# Patient Record
Sex: FEMALE | Race: WHITE | HISPANIC OR LATINO
[De-identification: names, ages, dates, MRNs, and addresses within clinical notes are randomized per-mention and may not be internally consistent; named-entity substitution may affect disease eponyms.]

---

## 2017-01-06 ENCOUNTER — TRANSCRIPTION ENCOUNTER (OUTPATIENT)
Age: 27
End: 2017-01-06

## 2018-10-09 ENCOUNTER — EMERGENCY (EMERGENCY)
Facility: HOSPITAL | Age: 28
LOS: 1 days | End: 2018-10-09
Payer: MEDICAID

## 2018-10-09 PROCEDURE — 73503 X-RAY EXAM HIP UNI 4/> VIEWS: CPT | Mod: 26,LT

## 2018-10-09 PROCEDURE — 99283 EMERGENCY DEPT VISIT LOW MDM: CPT

## 2020-04-07 ENCOUNTER — TRANSCRIPTION ENCOUNTER (OUTPATIENT)
Age: 30
End: 2020-04-07

## 2020-04-07 ENCOUNTER — OUTPATIENT (OUTPATIENT)
Dept: OUTPATIENT SERVICES | Facility: HOSPITAL | Age: 30
LOS: 1 days | End: 2020-04-07

## 2020-04-07 ENCOUNTER — INPATIENT (INPATIENT)
Facility: HOSPITAL | Age: 30
LOS: 2 days | Discharge: HOME | End: 2020-04-10
Admitting: INTERNAL MEDICINE
Payer: COMMERCIAL

## 2020-04-07 PROCEDURE — 71045 X-RAY EXAM CHEST 1 VIEW: CPT | Mod: 26

## 2020-04-07 PROCEDURE — 99285 EMERGENCY DEPT VISIT HI MDM: CPT

## 2020-04-08 ENCOUNTER — OUTPATIENT (OUTPATIENT)
Dept: OUTPATIENT SERVICES | Facility: HOSPITAL | Age: 30
LOS: 1 days | End: 2020-04-08

## 2020-04-09 ENCOUNTER — OUTPATIENT (OUTPATIENT)
Dept: OUTPATIENT SERVICES | Facility: HOSPITAL | Age: 30
LOS: 1 days | End: 2020-04-09

## 2020-04-10 ENCOUNTER — OUTPATIENT (OUTPATIENT)
Dept: OUTPATIENT SERVICES | Facility: HOSPITAL | Age: 30
LOS: 1 days | End: 2020-04-10

## 2022-01-09 ENCOUNTER — TRANSCRIPTION ENCOUNTER (OUTPATIENT)
Age: 32
End: 2022-01-09

## 2022-01-18 ENCOUNTER — EMERGENCY (EMERGENCY)
Facility: HOSPITAL | Age: 32
LOS: 1 days | Discharge: HOME | End: 2022-01-18
Admitting: EMERGENCY MEDICINE
Payer: COMMERCIAL

## 2022-01-18 PROCEDURE — 93010 ELECTROCARDIOGRAM REPORT: CPT

## 2022-01-18 PROCEDURE — 99285 EMERGENCY DEPT VISIT HI MDM: CPT

## 2022-01-18 PROCEDURE — 71045 X-RAY EXAM CHEST 1 VIEW: CPT | Mod: 26

## 2022-01-27 DIAGNOSIS — R06.00 DYSPNEA, UNSPECIFIED: ICD-10-CM

## 2022-01-27 DIAGNOSIS — U07.1 COVID-19: ICD-10-CM

## 2022-01-27 DIAGNOSIS — J45.909 UNSPECIFIED ASTHMA, UNCOMPLICATED: ICD-10-CM

## 2022-09-01 ENCOUNTER — NON-APPOINTMENT (OUTPATIENT)
Age: 32
End: 2022-09-01

## 2023-03-16 ENCOUNTER — NON-APPOINTMENT (OUTPATIENT)
Age: 33
End: 2023-03-16

## 2023-05-21 ENCOUNTER — NON-APPOINTMENT (OUTPATIENT)
Age: 33
End: 2023-05-21

## 2023-09-03 ENCOUNTER — NON-APPOINTMENT (OUTPATIENT)
Age: 33
End: 2023-09-03

## 2023-11-29 ENCOUNTER — NON-APPOINTMENT (OUTPATIENT)
Age: 33
End: 2023-11-29

## 2023-12-11 ENCOUNTER — APPOINTMENT (OUTPATIENT)
Dept: AFTER HOURS CARE | Facility: EMERGENCY ROOM | Age: 33
End: 2023-12-11
Payer: COMMERCIAL

## 2023-12-12 ENCOUNTER — INPATIENT (INPATIENT)
Facility: HOSPITAL | Age: 33
LOS: 0 days | Discharge: HOME | DRG: 645 | End: 2023-12-13
Attending: STUDENT IN AN ORGANIZED HEALTH CARE EDUCATION/TRAINING PROGRAM | Admitting: STUDENT IN AN ORGANIZED HEALTH CARE EDUCATION/TRAINING PROGRAM
Payer: COMMERCIAL

## 2023-12-12 VITALS
TEMPERATURE: 97 F | WEIGHT: 285.06 LBS | OXYGEN SATURATION: 99 % | SYSTOLIC BLOOD PRESSURE: 135 MMHG | RESPIRATION RATE: 16 BRPM | DIASTOLIC BLOOD PRESSURE: 70 MMHG | HEIGHT: 65 IN | HEART RATE: 106 BPM

## 2023-12-12 DIAGNOSIS — D35.2 BENIGN NEOPLASM OF PITUITARY GLAND: ICD-10-CM

## 2023-12-12 PROBLEM — Z00.00 ENCOUNTER FOR PREVENTIVE HEALTH EXAMINATION: Status: ACTIVE | Noted: 2023-12-12

## 2023-12-12 LAB
ALBUMIN SERPL ELPH-MCNC: 3.6 G/DL — SIGNIFICANT CHANGE UP (ref 3.3–5.2)
ALBUMIN SERPL ELPH-MCNC: 3.6 G/DL — SIGNIFICANT CHANGE UP (ref 3.3–5.2)
ALP SERPL-CCNC: 79 U/L — SIGNIFICANT CHANGE UP (ref 40–120)
ALP SERPL-CCNC: 79 U/L — SIGNIFICANT CHANGE UP (ref 40–120)
ALT FLD-CCNC: 10 U/L — SIGNIFICANT CHANGE UP
ALT FLD-CCNC: 10 U/L — SIGNIFICANT CHANGE UP
AMPHET UR-MCNC: NEGATIVE — SIGNIFICANT CHANGE UP
AMPHET UR-MCNC: NEGATIVE — SIGNIFICANT CHANGE UP
ANION GAP SERPL CALC-SCNC: 14 MMOL/L — SIGNIFICANT CHANGE UP (ref 5–17)
ANION GAP SERPL CALC-SCNC: 14 MMOL/L — SIGNIFICANT CHANGE UP (ref 5–17)
APPEARANCE UR: ABNORMAL
APPEARANCE UR: ABNORMAL
APTT BLD: 26.7 SEC — SIGNIFICANT CHANGE UP (ref 24.5–35.6)
APTT BLD: 26.7 SEC — SIGNIFICANT CHANGE UP (ref 24.5–35.6)
AST SERPL-CCNC: 16 U/L — SIGNIFICANT CHANGE UP
AST SERPL-CCNC: 16 U/L — SIGNIFICANT CHANGE UP
BACTERIA # UR AUTO: ABNORMAL /HPF
BACTERIA # UR AUTO: ABNORMAL /HPF
BARBITURATES UR SCN-MCNC: NEGATIVE — SIGNIFICANT CHANGE UP
BARBITURATES UR SCN-MCNC: NEGATIVE — SIGNIFICANT CHANGE UP
BASOPHILS # BLD AUTO: 0.07 K/UL — SIGNIFICANT CHANGE UP (ref 0–0.2)
BASOPHILS # BLD AUTO: 0.07 K/UL — SIGNIFICANT CHANGE UP (ref 0–0.2)
BASOPHILS NFR BLD AUTO: 0.5 % — SIGNIFICANT CHANGE UP (ref 0–2)
BASOPHILS NFR BLD AUTO: 0.5 % — SIGNIFICANT CHANGE UP (ref 0–2)
BENZODIAZ UR-MCNC: NEGATIVE — SIGNIFICANT CHANGE UP
BENZODIAZ UR-MCNC: NEGATIVE — SIGNIFICANT CHANGE UP
BILIRUB SERPL-MCNC: <0.2 MG/DL — LOW (ref 0.4–2)
BILIRUB SERPL-MCNC: <0.2 MG/DL — LOW (ref 0.4–2)
BILIRUB UR-MCNC: NEGATIVE — SIGNIFICANT CHANGE UP
BILIRUB UR-MCNC: NEGATIVE — SIGNIFICANT CHANGE UP
BUN SERPL-MCNC: 8.6 MG/DL — SIGNIFICANT CHANGE UP (ref 8–20)
BUN SERPL-MCNC: 8.6 MG/DL — SIGNIFICANT CHANGE UP (ref 8–20)
CALCIUM SERPL-MCNC: 8.5 MG/DL — SIGNIFICANT CHANGE UP (ref 8.4–10.5)
CALCIUM SERPL-MCNC: 8.5 MG/DL — SIGNIFICANT CHANGE UP (ref 8.4–10.5)
CAST: 0 /LPF — SIGNIFICANT CHANGE UP (ref 0–4)
CAST: 0 /LPF — SIGNIFICANT CHANGE UP (ref 0–4)
CHLORIDE SERPL-SCNC: 97 MMOL/L — SIGNIFICANT CHANGE UP (ref 96–108)
CHLORIDE SERPL-SCNC: 97 MMOL/L — SIGNIFICANT CHANGE UP (ref 96–108)
CO2 SERPL-SCNC: 21 MMOL/L — LOW (ref 22–29)
CO2 SERPL-SCNC: 21 MMOL/L — LOW (ref 22–29)
COCAINE METAB.OTHER UR-MCNC: NEGATIVE — SIGNIFICANT CHANGE UP
COCAINE METAB.OTHER UR-MCNC: NEGATIVE — SIGNIFICANT CHANGE UP
COLOR SPEC: YELLOW — SIGNIFICANT CHANGE UP
COLOR SPEC: YELLOW — SIGNIFICANT CHANGE UP
CREAT SERPL-MCNC: 0.41 MG/DL — LOW (ref 0.5–1.3)
CREAT SERPL-MCNC: 0.41 MG/DL — LOW (ref 0.5–1.3)
DIFF PNL FLD: NEGATIVE — SIGNIFICANT CHANGE UP
DIFF PNL FLD: NEGATIVE — SIGNIFICANT CHANGE UP
EGFR: 132 ML/MIN/1.73M2 — SIGNIFICANT CHANGE UP
EGFR: 132 ML/MIN/1.73M2 — SIGNIFICANT CHANGE UP
EOSINOPHIL # BLD AUTO: 0.06 K/UL — SIGNIFICANT CHANGE UP (ref 0–0.5)
EOSINOPHIL # BLD AUTO: 0.06 K/UL — SIGNIFICANT CHANGE UP (ref 0–0.5)
EOSINOPHIL NFR BLD AUTO: 0.5 % — SIGNIFICANT CHANGE UP (ref 0–6)
EOSINOPHIL NFR BLD AUTO: 0.5 % — SIGNIFICANT CHANGE UP (ref 0–6)
FSH SERPL-MCNC: 1.7 IU/L — SIGNIFICANT CHANGE UP
FSH SERPL-MCNC: 1.7 IU/L — SIGNIFICANT CHANGE UP
GLUCOSE BLDC GLUCOMTR-MCNC: 122 MG/DL — HIGH (ref 70–99)
GLUCOSE BLDC GLUCOMTR-MCNC: 122 MG/DL — HIGH (ref 70–99)
GLUCOSE BLDC GLUCOMTR-MCNC: 136 MG/DL — HIGH (ref 70–99)
GLUCOSE BLDC GLUCOMTR-MCNC: 136 MG/DL — HIGH (ref 70–99)
GLUCOSE SERPL-MCNC: 145 MG/DL — HIGH (ref 70–99)
GLUCOSE SERPL-MCNC: 145 MG/DL — HIGH (ref 70–99)
GLUCOSE UR QL: NEGATIVE MG/DL — SIGNIFICANT CHANGE UP
GLUCOSE UR QL: NEGATIVE MG/DL — SIGNIFICANT CHANGE UP
HCG SERPL-ACNC: <4 MIU/ML — SIGNIFICANT CHANGE UP
HCG SERPL-ACNC: <4 MIU/ML — SIGNIFICANT CHANGE UP
HCG UR QL: NEGATIVE — SIGNIFICANT CHANGE UP
HCG UR QL: NEGATIVE — SIGNIFICANT CHANGE UP
HCT VFR BLD CALC: 36.5 % — SIGNIFICANT CHANGE UP (ref 34.5–45)
HCT VFR BLD CALC: 36.5 % — SIGNIFICANT CHANGE UP (ref 34.5–45)
HGB BLD-MCNC: 12.5 G/DL — SIGNIFICANT CHANGE UP (ref 11.5–15.5)
HGB BLD-MCNC: 12.5 G/DL — SIGNIFICANT CHANGE UP (ref 11.5–15.5)
IMM GRANULOCYTES NFR BLD AUTO: 0.3 % — SIGNIFICANT CHANGE UP (ref 0–0.9)
IMM GRANULOCYTES NFR BLD AUTO: 0.3 % — SIGNIFICANT CHANGE UP (ref 0–0.9)
INR BLD: 1.14 RATIO — SIGNIFICANT CHANGE UP (ref 0.85–1.18)
INR BLD: 1.14 RATIO — SIGNIFICANT CHANGE UP (ref 0.85–1.18)
KETONES UR-MCNC: NEGATIVE MG/DL — SIGNIFICANT CHANGE UP
KETONES UR-MCNC: NEGATIVE MG/DL — SIGNIFICANT CHANGE UP
LEUKOCYTE ESTERASE UR-ACNC: NEGATIVE — SIGNIFICANT CHANGE UP
LEUKOCYTE ESTERASE UR-ACNC: NEGATIVE — SIGNIFICANT CHANGE UP
LH SERPL-ACNC: 2.8 IU/L — SIGNIFICANT CHANGE UP
LH SERPL-ACNC: 2.8 IU/L — SIGNIFICANT CHANGE UP
LYMPHOCYTES # BLD AUTO: 16.7 % — SIGNIFICANT CHANGE UP (ref 13–44)
LYMPHOCYTES # BLD AUTO: 16.7 % — SIGNIFICANT CHANGE UP (ref 13–44)
LYMPHOCYTES # BLD AUTO: 2.22 K/UL — SIGNIFICANT CHANGE UP (ref 1–3.3)
LYMPHOCYTES # BLD AUTO: 2.22 K/UL — SIGNIFICANT CHANGE UP (ref 1–3.3)
MCHC RBC-ENTMCNC: 29.2 PG — SIGNIFICANT CHANGE UP (ref 27–34)
MCHC RBC-ENTMCNC: 29.2 PG — SIGNIFICANT CHANGE UP (ref 27–34)
MCHC RBC-ENTMCNC: 34.2 GM/DL — SIGNIFICANT CHANGE UP (ref 32–36)
MCHC RBC-ENTMCNC: 34.2 GM/DL — SIGNIFICANT CHANGE UP (ref 32–36)
MCV RBC AUTO: 85.3 FL — SIGNIFICANT CHANGE UP (ref 80–100)
MCV RBC AUTO: 85.3 FL — SIGNIFICANT CHANGE UP (ref 80–100)
METHADONE UR-MCNC: NEGATIVE — SIGNIFICANT CHANGE UP
METHADONE UR-MCNC: NEGATIVE — SIGNIFICANT CHANGE UP
MONOCYTES # BLD AUTO: 0.81 K/UL — SIGNIFICANT CHANGE UP (ref 0–0.9)
MONOCYTES # BLD AUTO: 0.81 K/UL — SIGNIFICANT CHANGE UP (ref 0–0.9)
MONOCYTES NFR BLD AUTO: 6.1 % — SIGNIFICANT CHANGE UP (ref 2–14)
MONOCYTES NFR BLD AUTO: 6.1 % — SIGNIFICANT CHANGE UP (ref 2–14)
NEUTROPHILS # BLD AUTO: 10.07 K/UL — HIGH (ref 1.8–7.4)
NEUTROPHILS # BLD AUTO: 10.07 K/UL — HIGH (ref 1.8–7.4)
NEUTROPHILS NFR BLD AUTO: 75.9 % — SIGNIFICANT CHANGE UP (ref 43–77)
NEUTROPHILS NFR BLD AUTO: 75.9 % — SIGNIFICANT CHANGE UP (ref 43–77)
NITRITE UR-MCNC: NEGATIVE — SIGNIFICANT CHANGE UP
NITRITE UR-MCNC: NEGATIVE — SIGNIFICANT CHANGE UP
OPIATES UR-MCNC: POSITIVE
OPIATES UR-MCNC: POSITIVE
PCP SPEC-MCNC: SIGNIFICANT CHANGE UP
PCP SPEC-MCNC: SIGNIFICANT CHANGE UP
PCP UR-MCNC: NEGATIVE — SIGNIFICANT CHANGE UP
PCP UR-MCNC: NEGATIVE — SIGNIFICANT CHANGE UP
PH UR: 7 — SIGNIFICANT CHANGE UP (ref 5–8)
PH UR: 7 — SIGNIFICANT CHANGE UP (ref 5–8)
PLATELET # BLD AUTO: 346 K/UL — SIGNIFICANT CHANGE UP (ref 150–400)
PLATELET # BLD AUTO: 346 K/UL — SIGNIFICANT CHANGE UP (ref 150–400)
POTASSIUM SERPL-MCNC: 3.8 MMOL/L — SIGNIFICANT CHANGE UP (ref 3.5–5.3)
POTASSIUM SERPL-MCNC: 3.8 MMOL/L — SIGNIFICANT CHANGE UP (ref 3.5–5.3)
POTASSIUM SERPL-SCNC: 3.8 MMOL/L — SIGNIFICANT CHANGE UP (ref 3.5–5.3)
POTASSIUM SERPL-SCNC: 3.8 MMOL/L — SIGNIFICANT CHANGE UP (ref 3.5–5.3)
PROLACTIN SERPL-MCNC: 32.8 NG/ML — HIGH (ref 3.4–24.1)
PROLACTIN SERPL-MCNC: 32.8 NG/ML — HIGH (ref 3.4–24.1)
PROLACTIN SERPL-MCNC: 34.2 NG/ML — HIGH (ref 3.4–24.1)
PROLACTIN SERPL-MCNC: 34.2 NG/ML — HIGH (ref 3.4–24.1)
PROT SERPL-MCNC: 6.7 G/DL — SIGNIFICANT CHANGE UP (ref 6.6–8.7)
PROT SERPL-MCNC: 6.7 G/DL — SIGNIFICANT CHANGE UP (ref 6.6–8.7)
PROT UR-MCNC: SIGNIFICANT CHANGE UP MG/DL
PROT UR-MCNC: SIGNIFICANT CHANGE UP MG/DL
PROTHROM AB SERPL-ACNC: 12.6 SEC — SIGNIFICANT CHANGE UP (ref 9.5–13)
PROTHROM AB SERPL-ACNC: 12.6 SEC — SIGNIFICANT CHANGE UP (ref 9.5–13)
RBC # BLD: 4.28 M/UL — SIGNIFICANT CHANGE UP (ref 3.8–5.2)
RBC # BLD: 4.28 M/UL — SIGNIFICANT CHANGE UP (ref 3.8–5.2)
RBC # FLD: 12.7 % — SIGNIFICANT CHANGE UP (ref 10.3–14.5)
RBC # FLD: 12.7 % — SIGNIFICANT CHANGE UP (ref 10.3–14.5)
RBC CASTS # UR COMP ASSIST: 3 /HPF — SIGNIFICANT CHANGE UP (ref 0–4)
RBC CASTS # UR COMP ASSIST: 3 /HPF — SIGNIFICANT CHANGE UP (ref 0–4)
SODIUM SERPL-SCNC: 132 MMOL/L — LOW (ref 135–145)
SODIUM SERPL-SCNC: 132 MMOL/L — LOW (ref 135–145)
SP GR SPEC: 1.03 — SIGNIFICANT CHANGE UP (ref 1–1.03)
SP GR SPEC: 1.03 — SIGNIFICANT CHANGE UP (ref 1–1.03)
SQUAMOUS # UR AUTO: 5 /HPF — SIGNIFICANT CHANGE UP (ref 0–5)
SQUAMOUS # UR AUTO: 5 /HPF — SIGNIFICANT CHANGE UP (ref 0–5)
T3 SERPL-MCNC: 200 NG/DL — SIGNIFICANT CHANGE UP (ref 80–200)
T3 SERPL-MCNC: 200 NG/DL — SIGNIFICANT CHANGE UP (ref 80–200)
T4 AB SER-ACNC: 10 UG/DL — SIGNIFICANT CHANGE UP (ref 4.5–12)
T4 AB SER-ACNC: 10 UG/DL — SIGNIFICANT CHANGE UP (ref 4.5–12)
TESTOST FREE+TOTAL PANEL SERPL-MCNC: 2.9 NG/DL — LOW (ref 8.4–48.1)
TESTOST FREE+TOTAL PANEL SERPL-MCNC: 2.9 NG/DL — LOW (ref 8.4–48.1)
THC UR QL: NEGATIVE — SIGNIFICANT CHANGE UP
THC UR QL: NEGATIVE — SIGNIFICANT CHANGE UP
TSH SERPL-MCNC: 1.7 UIU/ML — SIGNIFICANT CHANGE UP (ref 0.27–4.2)
TSH SERPL-MCNC: 1.7 UIU/ML — SIGNIFICANT CHANGE UP (ref 0.27–4.2)
UROBILINOGEN FLD QL: 0.2 MG/DL — SIGNIFICANT CHANGE UP (ref 0.2–1)
UROBILINOGEN FLD QL: 0.2 MG/DL — SIGNIFICANT CHANGE UP (ref 0.2–1)
WBC # BLD: 13.27 K/UL — HIGH (ref 3.8–10.5)
WBC # BLD: 13.27 K/UL — HIGH (ref 3.8–10.5)
WBC # FLD AUTO: 13.27 K/UL — HIGH (ref 3.8–10.5)
WBC # FLD AUTO: 13.27 K/UL — HIGH (ref 3.8–10.5)
WBC UR QL: 2 /HPF — SIGNIFICANT CHANGE UP (ref 0–5)
WBC UR QL: 2 /HPF — SIGNIFICANT CHANGE UP (ref 0–5)

## 2023-12-12 PROCEDURE — 99204 OFFICE O/P NEW MOD 45 MIN: CPT | Mod: NC,95

## 2023-12-12 PROCEDURE — 99223 1ST HOSP IP/OBS HIGH 75: CPT

## 2023-12-12 PROCEDURE — 70450 CT HEAD/BRAIN W/O DYE: CPT | Mod: 26,77

## 2023-12-12 PROCEDURE — 70553 MRI BRAIN STEM W/O & W/DYE: CPT | Mod: 26

## 2023-12-12 PROCEDURE — 99285 EMERGENCY DEPT VISIT HI MDM: CPT

## 2023-12-12 RX ORDER — OXYCODONE HYDROCHLORIDE 5 MG/1
5 TABLET ORAL EVERY 6 HOURS
Refills: 0 | Status: DISCONTINUED | OUTPATIENT
Start: 2023-12-12 | End: 2023-12-13

## 2023-12-12 RX ORDER — SODIUM CHLORIDE 9 MG/ML
1000 INJECTION INTRAMUSCULAR; INTRAVENOUS; SUBCUTANEOUS
Refills: 0 | Status: DISCONTINUED | OUTPATIENT
Start: 2023-12-12 | End: 2023-12-13

## 2023-12-12 RX ORDER — SODIUM CHLORIDE 9 MG/ML
1000 INJECTION, SOLUTION INTRAVENOUS
Refills: 0 | Status: DISCONTINUED | OUTPATIENT
Start: 2023-12-12 | End: 2023-12-13

## 2023-12-12 RX ORDER — DEXTROSE 50 % IN WATER 50 %
15 SYRINGE (ML) INTRAVENOUS ONCE
Refills: 0 | Status: DISCONTINUED | OUTPATIENT
Start: 2023-12-12 | End: 2023-12-13

## 2023-12-12 RX ORDER — DEXTROSE 50 % IN WATER 50 %
25 SYRINGE (ML) INTRAVENOUS ONCE
Refills: 0 | Status: DISCONTINUED | OUTPATIENT
Start: 2023-12-12 | End: 2023-12-13

## 2023-12-12 RX ORDER — ONDANSETRON 8 MG/1
4 TABLET, FILM COATED ORAL EVERY 4 HOURS
Refills: 0 | Status: DISCONTINUED | OUTPATIENT
Start: 2023-12-12 | End: 2023-12-13

## 2023-12-12 RX ORDER — GLUCAGON INJECTION, SOLUTION 0.5 MG/.1ML
1 INJECTION, SOLUTION SUBCUTANEOUS ONCE
Refills: 0 | Status: DISCONTINUED | OUTPATIENT
Start: 2023-12-12 | End: 2023-12-13

## 2023-12-12 RX ORDER — OXYCODONE HYDROCHLORIDE 5 MG/1
10 TABLET ORAL EVERY 6 HOURS
Refills: 0 | Status: DISCONTINUED | OUTPATIENT
Start: 2023-12-12 | End: 2023-12-13

## 2023-12-12 RX ORDER — METOCLOPRAMIDE HCL 10 MG
10 TABLET ORAL ONCE
Refills: 0 | Status: COMPLETED | OUTPATIENT
Start: 2023-12-12 | End: 2023-12-12

## 2023-12-12 RX ORDER — DEXTROSE 50 % IN WATER 50 %
12.5 SYRINGE (ML) INTRAVENOUS ONCE
Refills: 0 | Status: DISCONTINUED | OUTPATIENT
Start: 2023-12-12 | End: 2023-12-13

## 2023-12-12 RX ORDER — INSULIN LISPRO 100/ML
VIAL (ML) SUBCUTANEOUS
Refills: 0 | Status: DISCONTINUED | OUTPATIENT
Start: 2023-12-12 | End: 2023-12-13

## 2023-12-12 RX ORDER — ACETAMINOPHEN 500 MG
1000 TABLET ORAL ONCE
Refills: 0 | Status: COMPLETED | OUTPATIENT
Start: 2023-12-12 | End: 2023-12-12

## 2023-12-12 RX ORDER — MORPHINE SULFATE 50 MG/1
2 CAPSULE, EXTENDED RELEASE ORAL ONCE
Refills: 0 | Status: DISCONTINUED | OUTPATIENT
Start: 2023-12-12 | End: 2023-12-12

## 2023-12-12 RX ADMIN — MORPHINE SULFATE 2 MILLIGRAM(S): 50 CAPSULE, EXTENDED RELEASE ORAL at 09:53

## 2023-12-12 RX ADMIN — OXYCODONE HYDROCHLORIDE 10 MILLIGRAM(S): 5 TABLET ORAL at 14:00

## 2023-12-12 RX ADMIN — ONDANSETRON 4 MILLIGRAM(S): 8 TABLET, FILM COATED ORAL at 18:53

## 2023-12-12 RX ADMIN — Medication 10 MILLIGRAM(S): at 09:11

## 2023-12-12 RX ADMIN — MORPHINE SULFATE 2 MILLIGRAM(S): 50 CAPSULE, EXTENDED RELEASE ORAL at 09:11

## 2023-12-12 RX ADMIN — Medication 400 MILLIGRAM(S): at 20:14

## 2023-12-12 RX ADMIN — OXYCODONE HYDROCHLORIDE 10 MILLIGRAM(S): 5 TABLET ORAL at 18:54

## 2023-12-12 RX ADMIN — OXYCODONE HYDROCHLORIDE 10 MILLIGRAM(S): 5 TABLET ORAL at 13:23

## 2023-12-12 RX ADMIN — ONDANSETRON 4 MILLIGRAM(S): 8 TABLET, FILM COATED ORAL at 13:22

## 2023-12-12 NOTE — H&P ADULT - NSHPPHYSICALEXAM_GEN_ALL_CORE
IVF:  Vital Signs Last 24 Hrs  T(C): 36.1 (12 Dec 2023 08:22), Max: 36.1 (12 Dec 2023 08:22)  T(F): 97 (12 Dec 2023 08:22), Max: 97 (12 Dec 2023 08:22)  HR: 106 (12 Dec 2023 08:22) (106 - 106)  BP: 135/70 (12 Dec 2023 08:22) (135/70 - 135/70)  BP(mean): --  RR: 16 (12 Dec 2023 08:22) (16 - 16)  SpO2: 99% (12 Dec 2023 08:22) (99% - 99%)    Parameters below as of 12 Dec 2023 08:22  Patient On (Oxygen Delivery Method): room air    PHYSICAL EXAM:  GENERAL: NAD, Overweight  HEAD:  Atraumatic, normocephalic  NECK: Supple  NANCY COMA SCORE: E- V- M- =15  MENTAL STATUS: AAO x3; Awake, Opens eyes spontaneously, Appropriately conversant without aphasia, following simple commands  CRANIAL NERVES: Visual acuity normal for age, visual fields full to confrontation, PERRL. EOMI without nystagmus. Facial sensation intact V1-3 distribution b/l. Face symmetric w/ normal eye closure and smile, tongue midline. Hearing grossly intact. Speech clear  MOTOR: strength 5/5 b/l upper and lower extremities  HEART: +S1/+S2; Regular rate and rhythm; no murmurs, rubs, or gallops  ABDOMEN: Soft, nontender  SKIN: Warm, dry; no rashes or lesions

## 2023-12-12 NOTE — H&P ADULT - NSHPREVIEWOFSYSTEMS_GEN_ALL_CORE
[As Noted in HPI] : as noted in HPI [Negative] : Heme/Lymph REVIEW OF SYSTEMS  CONSTITUTIONAL: No fever  EYES: No visual disturbances  NECK: No pain or stiffness  BREASTS: No  nipple discharge  GASTROINTESTINAL: (+) Nausea   NEUROLOGICAL: (+) Headaches  SKIN: No itching, burning, rashes, or lesions   LYMPH NODES: No enlarged glands

## 2023-12-12 NOTE — ED PROVIDER NOTE - CADM POA URETHRAL CATHETER
Patient remains 1 on 1 due to aggressive behavior  Patient did not have any behavior this shift     Patient denies SI HI VH and depression  Patient reports anxiety and voices that come and go but cannot tell me what they are saying  Patient required redirection at times with random yelling  Patient becomes easily agitated but can deescalate just as easily  Patient appearance unremarkable  Thoughts are illogical and paranoid   Patient is very pleasant with staff and selectively social with peers  Patient is  Medication compliant  Medication compliant   Lithium level 0 7   depakote level 108 Will continue to monitor and provide suppoprt  7 minute safety and behavioral checks maintained  No

## 2023-12-12 NOTE — ED PROVIDER NOTE - NS ED ROS FT
Gen: No fever,in pain  eyes: no blurry vision  ENT: No congestion, no rhinorrhea  Resp: No cough, no trouble breathing  Cardiovascular: No chest pain, no palpitation  Gastrointestinal: No nausea, no vomiting, no diarrhea  :  No change in urine output; no dysuria, no hematuria  MS: No joint or muscle pain  Skin: No rashes  Neuro: + headache sudden onset, numb right side of  face   Remainder negative, except as per the HPI

## 2023-12-12 NOTE — ED ADULT NURSE REASSESSMENT NOTE - NS ED NURSE REASSESS COMMENT FT1
Pt laying in bed, resting. Pt reports improvement in her headache. Headache has not completely resolved. Neuro assessment as documented. Pt A&Ox4 in NAD. RR even & unlabored. Pt on cardiac monitor. Pt awaiting admission. Family @ bedside. Safety maintained.
Pt's mom to the nurses' station stating "My daughter needs the light off and that other pt is insisting he needs the lights on. That's not going to work for her. Can you get her a room upstairs?" This RN already made family aware that pt is awaiting room assignment and that at this time there is not a bed available to move pt to.
Report given to CHANO Wise.
Pt laying in bed, resting/sleeping. Pt A&Ox4 in NAD @ this time. RR even & unlabored. Pt on portable tele box. Pt's neuro assessment as documented. Pt awaiting admission. Safety maintained. Family @ bedside.
Pt laying in bed, A&Ox4 c/o headache & nausea. Neuro assessment as documented. RR even & unlabored. Pt NSR on cardiac monitor. Pt taken to MRI and then will be taken to CDU1R after. Mom @ bedside. Safety maintained.

## 2023-12-12 NOTE — ED ADULT NURSE NOTE - OBJECTIVE STATEMENT
Pt to ED c/o sudden onset of HA w/ n/v that started on Sunday w/ no improvement from Excedrin. Pt also c/o R facial numbness. Denies fever.

## 2023-12-12 NOTE — ED ADULT NURSE NOTE - NSFALLUNIVINTERV_ED_ALL_ED
Bed/Stretcher in lowest position, wheels locked, appropriate side rails in place/Call bell, personal items and telephone in reach/Instruct patient to call for assistance before getting out of bed/chair/stretcher/Non-slip footwear applied when patient is off stretcher/[place] to call system/Physically safe environment - no spills, clutter or unnecessary equipment/Purposeful proactive rounding/Room/bathroom lighting operational, light cord in reach Bed/Stretcher in lowest position, wheels locked, appropriate side rails in place/Call bell, personal items and telephone in reach/Instruct patient to call for assistance before getting out of bed/chair/stretcher/Non-slip footwear applied when patient is off stretcher/[place] to call system/Physically safe environment - no spills, clutter or unnecessary equipment/Purposeful proactive rounding/Room/bathroom lighting operational, light cord in reach

## 2023-12-12 NOTE — ED PROVIDER NOTE - CLINICAL SUMMARY MEDICAL DECISION MAKING FREE TEXT BOX
32 y/o female PMH Obese, DM on metformin, Hx of irregular menstrual periods. Comes to ED transferred form [facility] due to Pituitary mass but bleeding wasn't ruled out.  HA started Sunday morning right after waking up, pain was 10/10 in intensity and didn't went away. She has been taking OCP meds to regulate her menstrual cycle. Pt now presenting photophobia, neck pain that increase with neck flexion and numbness on the right side of face and HA 10/10 nausea and NBNB vomits. Denies blurry vison. Visual fields are intact. EOM preserved function. No diplopia. Strenght 5/5 all extremities.    A/P   1. NSG team alerted -> admit  2. Neuro exam   3. PAin meds and reglan  4. CT head    Dispo. Admit to NSG 34 y/o female PMH Obese, DM on metformin, Hx of irregular menstrual periods. Comes to ED transferred form [facility] due to Pituitary mass but bleeding wasn't ruled out.  HA started Sunday morning right after waking up, pain was 10/10 in intensity and didn't went away. She has been taking OCP meds to regulate her menstrual cycle. Pt now presenting photophobia, neck pain that increase with neck flexion and numbness on the right side of face and HA 10/10 nausea and NBNB vomits. Denies blurry vison. Visual fields are intact. EOM preserved function. No diplopia. Strenght 5/5 all extremities.    A/P   1. NSG team alerted -> admit  2. Neuro exam   3. PAin meds and reglan  4. CT head    Dispo. Admit to NSG

## 2023-12-12 NOTE — H&P ADULT - NS ATTEND AMEND GEN_ALL_CORE FT
I agree with the above. I personally examined and saw the patient. Complaining of headache and abdominal pain but no cranial nerve deficits or examination findings concerning for apoplexy. MRI pituitary for further characterization, pain control, likely outpatient management of pituitary lesion. Ok for diet today.

## 2023-12-12 NOTE — ED ADULT NURSE NOTE - COVID-19 ORDERING FACILITY
[facility]  - [facility] [facility]  - [facility]

## 2023-12-12 NOTE — H&P ADULT - ASSESSMENT
32 yo female who presented with a headache and was found to have concerns for a pituitary mass    Plan:  -Admit to Dr. Yamila Sanders, q4 neurochecks and vitals  - MRI Sella w/wo contrast  -Pituitary labs  -ADAT  -Pain Control  -SCD's   -DM: ISS  -MRI Sella w/wo contrast   -Case and plan discussed with Dr. Sanders 34 yo female who presented with a headache and was found to have concerns for a pituitary mass    Plan:  -Admit to Dr. Yamila Sanders, q4 neurochecks and vitals  - MRI Sella w/wo contrast  -Pituitary labs  -ADAT  -Pain Control  -SCD's   -DM: ISS  -MRI Sella w/wo contrast   -Case and plan discussed with Dr. Sanders 32 yo female who presented with a headache and was found to have concerns for a pituitary mass    Plan:  -Admit to Dr. Yamila Sanders, q4 neurochecks and vitals  -MRI Sella w/wo contrast  -Pituitary labs  -ADAT  -Pain Control  -SCD's   -DM: ISS  -MRI Sella w/wo contrast   -Case and plan discussed with Dr. Sanders 34 yo female who presented with a headache and was found to have concerns for a pituitary mass    Plan:  -Admit to Dr. Yamila Sanders, q4 neurochecks and vitals  -MRI Sella w/wo contrast  -Pituitary labs  -ADAT  -Pain Control  -SCD's   -DM: ISS  -MRI Sella w/wo contrast   -Case and plan discussed with Dr. Sanders

## 2023-12-12 NOTE — H&P ADULT - NSHPLABSRESULTS_GEN_ALL_CORE
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    Recommend neurosurgery consultation. Further workup with pituitary protocol MRI is advised.    --- End of Report ---            DALILA DELGADO MD; Attending Radiologist  This document has been electronically signed. Dec 12 2023 4:31AM    CAPRINI SCORE [CLOT]:  Patient has an estimated Caprini score of greater than 5.  However, the patient's unique clinical situation will be addressed in an individual manner to determine appropriate anticoagulation treatment, if any.

## 2023-12-12 NOTE — ED PROVIDER NOTE - COVID-19 ORDERING FACILITY
[facility]  - [facility] [facility]  - [facility]

## 2023-12-12 NOTE — ED PROVIDER NOTE - PHYSICAL EXAMINATION
Gen: obese, + acute distress  Head: normocephalic, atraumatic  EENT: EOMI, PERRLA, dry mucous membranes,  Lung: no increased work of breathing, clear to auscultation bilaterally, no wheezing,   CV: regular rate, regular rhythm, normal   Abd: soft, non-tender, non-distended, no rebound tenderness or guarding; no CVA tenderness  MSK: No edema, no visible deformities, full range of motion in all 4 extremities  Neuro: Awake, alert, oriented x3, decreased sensation on right side of face, Visual fields are intact. EOM preserved function. No diplopia. Strenght 5/5 all extremities.  Skin: No obvious rash, no jaundice  Psych: normal affect, normal speech

## 2023-12-12 NOTE — ED PROVIDER NOTE - PRINCIPAL DIAGNOSIS
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

## 2023-12-12 NOTE — ED PROVIDER NOTE - OBJECTIVE STATEMENT
32 y/o female PMH Obese, DM on metformin, Hx of irregular menstrual periods. Comes to ED transferred form [facility] due to Pituitary mass but bleeding wasn't ruled out.  HA started Sunday morning right after waking up, pain was 10/10 in intensity and didn't went away. She has been taking OCP meds to regulate her menstrual cycle. Pt now presenting photophobia, neck pain that increase with neck flexion and numbness on the right side of face and HA 10/10 nausea and NBNB vomits. Denies blurry vison. Visual fields are intact. EOM preserved function. No diplopia. Strenght 5/5 all extremities.

## 2023-12-12 NOTE — ED PROVIDER NOTE - ATTENDING CONTRIBUTION TO CARE
I personally saw the patient with the resident, and completed the key components of the history and physical exam. I then discussed the management plan with the resident.    32 y/o F with PMH DM on metformin, obesity transferred from [facility] for pituitary mass found on CT head when patient initially presented with sudden onset of worse headache of her life with photophobia, neck pain, intractable nausea and vomiting. Denies fevers. Her pain is currently 7/10. She denies vision changes, focal weakness, difficulty with speech or swallow.    PE - A&O x 3, mildly decreased sensation to right face, intact visual fields, EOMI, strength/sensation symmetrically intact all extremities, clear speech, no meningismus of neck, appears very uncomfortable.    Pain control - neurosurgery consulted, recommends CT and MRI, but will admit to NSx. I personally saw the patient with the resident, and completed the key components of the history and physical exam. I then discussed the management plan with the resident.    34 y/o F with PMH DM on metformin, obesity transferred [place] [facility] [place] found [place] CT head when patient initially presented with sudden onset of worse headache of her life with photophobia, neck pain, intractable nausea and vomiting. Denies fevers. Her pain is currently 7/10. She denies vision changes, focal weakness, difficulty with speech or swallow.    PE - A&O x 3, mildly decreased sensation to right face, intact visual fields, EOMI, strength/sensation symmetrically intact all extremities, clear speech, no meningismus of neck, appears very uncomfortable.    Pain control - neurosurgery consulted, recommends CT and MRI, but will admit to NSx.

## 2023-12-13 ENCOUNTER — TRANSCRIPTION ENCOUNTER (OUTPATIENT)
Age: 33
End: 2023-12-13

## 2023-12-13 VITALS
OXYGEN SATURATION: 99 % | DIASTOLIC BLOOD PRESSURE: 88 MMHG | RESPIRATION RATE: 18 BRPM | SYSTOLIC BLOOD PRESSURE: 130 MMHG | TEMPERATURE: 99 F | HEART RATE: 88 BPM

## 2023-12-13 LAB
A1C WITH ESTIMATED AVERAGE GLUCOSE RESULT: 7.1 % — HIGH (ref 4–5.6)
A1C WITH ESTIMATED AVERAGE GLUCOSE RESULT: 7.1 % — HIGH (ref 4–5.6)
ALBUMIN SERPL ELPH-MCNC: 3.8 G/DL — SIGNIFICANT CHANGE UP (ref 3.3–5.2)
ALBUMIN SERPL ELPH-MCNC: 3.8 G/DL — SIGNIFICANT CHANGE UP (ref 3.3–5.2)
ALP SERPL-CCNC: 91 U/L — SIGNIFICANT CHANGE UP (ref 40–120)
ALP SERPL-CCNC: 91 U/L — SIGNIFICANT CHANGE UP (ref 40–120)
ALT FLD-CCNC: 7 U/L — SIGNIFICANT CHANGE UP
ALT FLD-CCNC: 7 U/L — SIGNIFICANT CHANGE UP
ANION GAP SERPL CALC-SCNC: 11 MMOL/L — SIGNIFICANT CHANGE UP (ref 5–17)
ANION GAP SERPL CALC-SCNC: 11 MMOL/L — SIGNIFICANT CHANGE UP (ref 5–17)
AST SERPL-CCNC: 7 U/L — SIGNIFICANT CHANGE UP
AST SERPL-CCNC: 7 U/L — SIGNIFICANT CHANGE UP
BILIRUB SERPL-MCNC: 0.3 MG/DL — LOW (ref 0.4–2)
BILIRUB SERPL-MCNC: 0.3 MG/DL — LOW (ref 0.4–2)
BUN SERPL-MCNC: 4.7 MG/DL — LOW (ref 8–20)
BUN SERPL-MCNC: 4.7 MG/DL — LOW (ref 8–20)
CALCIUM SERPL-MCNC: 8.8 MG/DL — SIGNIFICANT CHANGE UP (ref 8.4–10.5)
CALCIUM SERPL-MCNC: 8.8 MG/DL — SIGNIFICANT CHANGE UP (ref 8.4–10.5)
CHLORIDE SERPL-SCNC: 101 MMOL/L — SIGNIFICANT CHANGE UP (ref 96–108)
CHLORIDE SERPL-SCNC: 101 MMOL/L — SIGNIFICANT CHANGE UP (ref 96–108)
CO2 SERPL-SCNC: 24 MMOL/L — SIGNIFICANT CHANGE UP (ref 22–29)
CO2 SERPL-SCNC: 24 MMOL/L — SIGNIFICANT CHANGE UP (ref 22–29)
CORTIS AM PEAK SERPL-MCNC: 29.8 UG/DL — HIGH (ref 6–18.4)
CORTIS AM PEAK SERPL-MCNC: 29.8 UG/DL — HIGH (ref 6–18.4)
CREAT SERPL-MCNC: 0.37 MG/DL — LOW (ref 0.5–1.3)
CREAT SERPL-MCNC: 0.37 MG/DL — LOW (ref 0.5–1.3)
EGFR: 136 ML/MIN/1.73M2 — SIGNIFICANT CHANGE UP
EGFR: 136 ML/MIN/1.73M2 — SIGNIFICANT CHANGE UP
ESTIMATED AVERAGE GLUCOSE: 157 MG/DL — HIGH (ref 68–114)
ESTIMATED AVERAGE GLUCOSE: 157 MG/DL — HIGH (ref 68–114)
GH SERPL-MCNC: 1.7 NG/ML — SIGNIFICANT CHANGE UP (ref 0.12–9.88)
GH SERPL-MCNC: 1.7 NG/ML — SIGNIFICANT CHANGE UP (ref 0.12–9.88)
GLUCOSE BLDC GLUCOMTR-MCNC: 126 MG/DL — HIGH (ref 70–99)
GLUCOSE BLDC GLUCOMTR-MCNC: 126 MG/DL — HIGH (ref 70–99)
GLUCOSE BLDC GLUCOMTR-MCNC: 181 MG/DL — HIGH (ref 70–99)
GLUCOSE BLDC GLUCOMTR-MCNC: 181 MG/DL — HIGH (ref 70–99)
GLUCOSE SERPL-MCNC: 155 MG/DL — HIGH (ref 70–99)
GLUCOSE SERPL-MCNC: 155 MG/DL — HIGH (ref 70–99)
INSULIN-LIKE GROWTH FACTOR 1 INTERPRETATION: SIGNIFICANT CHANGE UP
INSULIN-LIKE GROWTH FACTOR 1 INTERPRETATION: SIGNIFICANT CHANGE UP
INSULIN-LIKE GROWTH FACTOR 1: 129 NG/ML — SIGNIFICANT CHANGE UP (ref 82–279)
INSULIN-LIKE GROWTH FACTOR 1: 129 NG/ML — SIGNIFICANT CHANGE UP (ref 82–279)
MAGNESIUM SERPL-MCNC: 2.1 MG/DL — SIGNIFICANT CHANGE UP (ref 1.8–2.6)
MAGNESIUM SERPL-MCNC: 2.1 MG/DL — SIGNIFICANT CHANGE UP (ref 1.8–2.6)
PHOSPHATE SERPL-MCNC: 3.4 MG/DL — SIGNIFICANT CHANGE UP (ref 2.4–4.7)
PHOSPHATE SERPL-MCNC: 3.4 MG/DL — SIGNIFICANT CHANGE UP (ref 2.4–4.7)
POTASSIUM SERPL-MCNC: 4 MMOL/L — SIGNIFICANT CHANGE UP (ref 3.5–5.3)
POTASSIUM SERPL-MCNC: 4 MMOL/L — SIGNIFICANT CHANGE UP (ref 3.5–5.3)
POTASSIUM SERPL-SCNC: 4 MMOL/L — SIGNIFICANT CHANGE UP (ref 3.5–5.3)
POTASSIUM SERPL-SCNC: 4 MMOL/L — SIGNIFICANT CHANGE UP (ref 3.5–5.3)
PROT SERPL-MCNC: 7.2 G/DL — SIGNIFICANT CHANGE UP (ref 6.6–8.7)
PROT SERPL-MCNC: 7.2 G/DL — SIGNIFICANT CHANGE UP (ref 6.6–8.7)
SODIUM SERPL-SCNC: 135 MMOL/L — SIGNIFICANT CHANGE UP (ref 135–145)
SODIUM SERPL-SCNC: 135 MMOL/L — SIGNIFICANT CHANGE UP (ref 135–145)

## 2023-12-13 PROCEDURE — 81001 URINALYSIS AUTO W/SCOPE: CPT

## 2023-12-13 PROCEDURE — 80053 COMPREHEN METABOLIC PANEL: CPT

## 2023-12-13 PROCEDURE — 84436 ASSAY OF TOTAL THYROXINE: CPT

## 2023-12-13 PROCEDURE — 85025 COMPLETE CBC W/AUTO DIFF WBC: CPT

## 2023-12-13 PROCEDURE — G1004: CPT

## 2023-12-13 PROCEDURE — 82962 GLUCOSE BLOOD TEST: CPT

## 2023-12-13 PROCEDURE — 99239 HOSP IP/OBS DSCHRG MGMT >30: CPT

## 2023-12-13 PROCEDURE — 70553 MRI BRAIN STEM W/O & W/DYE: CPT | Mod: MG

## 2023-12-13 PROCEDURE — 82533 TOTAL CORTISOL: CPT

## 2023-12-13 PROCEDURE — 80307 DRUG TEST PRSMV CHEM ANLYZR: CPT

## 2023-12-13 PROCEDURE — 83735 ASSAY OF MAGNESIUM: CPT

## 2023-12-13 PROCEDURE — 82024 ASSAY OF ACTH: CPT

## 2023-12-13 PROCEDURE — 83001 ASSAY OF GONADOTROPIN (FSH): CPT

## 2023-12-13 PROCEDURE — 84146 ASSAY OF PROLACTIN: CPT

## 2023-12-13 PROCEDURE — 81025 URINE PREGNANCY TEST: CPT

## 2023-12-13 PROCEDURE — 84305 ASSAY OF SOMATOMEDIN: CPT

## 2023-12-13 PROCEDURE — 36415 COLL VENOUS BLD VENIPUNCTURE: CPT

## 2023-12-13 PROCEDURE — 84403 ASSAY OF TOTAL TESTOSTERONE: CPT

## 2023-12-13 PROCEDURE — 70450 CT HEAD/BRAIN W/O DYE: CPT | Mod: MA

## 2023-12-13 PROCEDURE — 84443 ASSAY THYROID STIM HORMONE: CPT

## 2023-12-13 PROCEDURE — 84702 CHORIONIC GONADOTROPIN TEST: CPT

## 2023-12-13 PROCEDURE — 83002 ASSAY OF GONADOTROPIN (LH): CPT

## 2023-12-13 PROCEDURE — 83036 HEMOGLOBIN GLYCOSYLATED A1C: CPT

## 2023-12-13 PROCEDURE — 83003 ASSAY GROWTH HORMONE (HGH): CPT

## 2023-12-13 PROCEDURE — 84480 ASSAY TRIIODOTHYRONINE (T3): CPT

## 2023-12-13 PROCEDURE — 85610 PROTHROMBIN TIME: CPT

## 2023-12-13 PROCEDURE — 84100 ASSAY OF PHOSPHORUS: CPT

## 2023-12-13 PROCEDURE — 85730 THROMBOPLASTIN TIME PARTIAL: CPT

## 2023-12-13 RX ORDER — ACETAMINOPHEN 500 MG
1000 TABLET ORAL ONCE
Refills: 0 | Status: COMPLETED | OUTPATIENT
Start: 2023-12-13 | End: 2023-12-13

## 2023-12-13 RX ADMIN — Medication 1000 MILLIGRAM(S): at 06:58

## 2023-12-13 RX ADMIN — Medication 400 MILLIGRAM(S): at 05:27

## 2023-12-13 NOTE — DISCHARGE NOTE PROVIDER - CARE PROVIDER_API CALL
Yamila Sanders  Neurosurgery  [address] [place] [id number]  Phone: [phone]  Fax: [phone]  Follow Up Time: 1 month   Yamila Sanders  Neurosurgery  [address] [place] [id number]  Phone: [phone]  Fax: [phone]  Follow Up Time: 1 month

## 2023-12-13 NOTE — DISCHARGE NOTE NURSING/CASE MANAGEMENT/SOCIAL WORK - NSDCPEFALRISK_GEN_ALL_CORE
For information on Fall & Injury Prevention, visit: [email or web address] OR  [email or web address] OR  https://www.cdc.gov/steadi/patient.html For information on Fall & Injury Prevention, visit: [email or web address]/[email or web address] OR  [email or web address]/[email or web address]-injury OR  https://www.cdc.gov/steadi/patient.html

## 2023-12-13 NOTE — DISCHARGE NOTE PROVIDER - NSCORESITESY/N_GEN_A_CORE_RD
Chart reviewed. Call to patient. C/o sinus pain/pressure and facial tenderness. States sx started 10 days ago. Reports green nasal drainage. Denies any teeth pain. No ear pain. HA comes and goes. C/o productive cough, post nasal drip, and a sore throat. Reports green sputum. Denies fever or chills. No body aches. Appetite WNL. Denies N/V. No diarrhea. Pt has been taking OTC Mucinex D and [place] cold and sinus with minimal relief. Same day appt scheduled.     Reason for Disposition  • [1] Sinus congestion (pressure, fullness) AND [2] present > 10 days    Protocols used: SINUS PAIN AND CONGESTION-A-AH       No

## 2023-12-13 NOTE — DISCHARGE NOTE NURSING/CASE MANAGEMENT/SOCIAL WORK - PATIENT PORTAL LINK FT
You can access the FollowMyHealth Patient Portal offered by [facility] by registering at the following website: [email or web address]. By joining [email or web address]’s FollowMyHealth portal, you will also be able to view your health information using other applications (apps) compatible with our system. You can access the FollowMyHealth Patient Portal offered by [facility] by registering at the following website: [email or web address]. By joining [employer]’s FollowMyHealth portal, you will also be able to view your health information using other applications (apps) compatible with our system.

## 2023-12-13 NOTE — DISCHARGE NOTE PROVIDER - NSDCFUADDAPPT_GEN_ALL_CORE_FT
Please make outpatient appointment with Endocrinologist for follow up of pituitary labs and  elevated A1C.    Also please call Dr. Sanders's clinic to schedule an appointment for follow up in 3-4 weeks.     Please go to the nearest ED for worsening headaches, vision changes, nausea, vomiting, or any additional concerning symptoms.

## 2023-12-13 NOTE — DISCHARGE NOTE PROVIDER - HOSPITAL COURSE
32 yo female with a pmhx of DM2, psoriatic arthritis, fibromyalgia who presented with headaches, nausea and vomiting. Patient was transferred from [facility] for a higher level of care. Patient was seen and examined by Dr. 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 Patient was deem stable for discharge with outpatient follow up. 32 yo female with a pmhx of DM2, psoriatic arthritis, fibromyalgia who presented with headaches, nausea and vomiting. Patient was transferred from [facility] for a higher level of care. Patient was seen and examined by Dr. 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 Patient was deem stable for discharge with outpatient follow up.

## 2023-12-13 NOTE — DISCHARGE NOTE PROVIDER - PROVIDER TOKENS
PROVIDER:[TOKEN:[[id number]],FOLLOWUP:[1 month]] PROVIDER:[TOKEN:[[id number]],FOLLOWUP:[1 month]]

## 2023-12-24 ENCOUNTER — TRANSCRIPTION ENCOUNTER (OUTPATIENT)
Age: 33
End: 2023-12-24

## 2023-12-24 LAB
CORTICOSTEROID BINDING GLOBULIN RESULT: 3.3 MG/DL — HIGH
CORTICOSTEROID BINDING GLOBULIN RESULT: 3.3 MG/DL — HIGH
CORTIS F/TOTAL MFR SERPL: 2.9 % — SIGNIFICANT CHANGE UP
CORTIS F/TOTAL MFR SERPL: 2.9 % — SIGNIFICANT CHANGE UP
CORTIS SERPL-MCNC: 11 UG/DL — SIGNIFICANT CHANGE UP
CORTIS SERPL-MCNC: 11 UG/DL — SIGNIFICANT CHANGE UP
CORTISOL, FREE RESULT: 0.32 UG/DL — SIGNIFICANT CHANGE UP
CORTISOL, FREE RESULT: 0.32 UG/DL — SIGNIFICANT CHANGE UP

## 2024-01-06 ENCOUNTER — NON-APPOINTMENT (OUTPATIENT)
Age: 34
End: 2024-01-06

## 2024-01-18 PROBLEM — E11.9 TYPE 2 DIABETES MELLITUS WITHOUT COMPLICATIONS: Chronic | Status: ACTIVE | Noted: 2023-12-12

## 2024-01-18 PROBLEM — M19.90 UNSPECIFIED OSTEOARTHRITIS, UNSPECIFIED SITE: Chronic | Status: ACTIVE | Noted: 2023-12-12

## 2024-01-18 PROBLEM — M79.7 FIBROMYALGIA: Chronic | Status: ACTIVE | Noted: 2023-12-12

## 2024-01-23 ENCOUNTER — NON-APPOINTMENT (OUTPATIENT)
Age: 34
End: 2024-01-23

## 2024-01-29 ENCOUNTER — APPOINTMENT (OUTPATIENT)
Dept: NEUROSURGERY | Facility: CLINIC | Age: 34
End: 2024-01-29
Payer: COMMERCIAL

## 2024-01-29 PROCEDURE — 99214 OFFICE O/P EST MOD 30 MIN: CPT | Mod: 95

## 2024-01-30 NOTE — ASSESSMENT
[FreeTextEntry1] : Ms. Bertram Marrufo is a very pleasant 33 year old female with fibromyalgia, psoriatic arthritis, diabetes who presents for follow-up after presenting to [facility] in December with sudden onset of headache and found to have a sellar mass. I reviewed with her her imaging and discussed with her the differential diagnosis of a macroadenoma vs. a cyst. I reviewed her case with my  Dr. Hussein Snowden and given the upward deflection of the optic chiasm, that our recommendation is for surgical resection via an endoscopic endonasal approach. I preliminarily reviewed the details of surgery. She will need a repeat MRI and a CTA head for intraoperative navigation and planning. I will refer her to Dr. Trace Harmon for formal visual luong and Dr. Da Weinberg who will help with the approach. She will then follow-up with Dr. Hussein Snowden who will be the primary neurosurgeon for her case given his experience and expertise.

## 2024-01-30 NOTE — REASON FOR VISIT
[Home] : at home, [unfilled] , at the time of the visit. [Medical Office: ([facility])___] : at the medical office located in  [Patient] : the patient [Follow-Up: _____] : a [unfilled] follow-up visit [FreeTextEntry1] : Ms. Bertram Marrufo is a very pleasant 33 year old female with fibromyalgia, psoriatic arthritis, diabetes who presents for follow-up after presenting to [facility] in December with sudden onset of headache and found to have a sellar mass. She denies any vision changes, leakage from her breast, changes in the size of her hand or feet. She does report chronic abnormal menstrual cycles for which she is on OCPs. She is seeing her outpatient ophthalmologist today.   Cortisol: 29.8 (6-18.4) Prolactin 34.2 (3.4-24.1) Diluted Prolactin 32.8 (3.4-24.1) LH 2.8 FSH 1.7 IGF-1: 129 Free cortisol 0.32 Growth hormone 1.7 TSH 1.7 T4 10 T3 200

## 2024-02-12 DIAGNOSIS — D35.2 BENIGN NEOPLASM OF PITUITARY GLAND: ICD-10-CM

## 2024-04-15 ENCOUNTER — NON-APPOINTMENT (OUTPATIENT)
Age: 34
End: 2024-04-15

## 2024-09-01 ENCOUNTER — NON-APPOINTMENT (OUTPATIENT)
Age: 34
End: 2024-09-01

## 2024-12-05 ENCOUNTER — NON-APPOINTMENT (OUTPATIENT)
Age: 34
End: 2024-12-05

## 2025-04-23 ENCOUNTER — APPOINTMENT (OUTPATIENT)
Dept: NEUROSURGERY | Facility: CLINIC | Age: 35
End: 2025-04-23

## 2025-04-24 ENCOUNTER — APPOINTMENT (OUTPATIENT)
Dept: NEUROSURGERY | Facility: CLINIC | Age: 35
End: 2025-04-24
Payer: COMMERCIAL

## 2025-04-24 ENCOUNTER — NON-APPOINTMENT (OUTPATIENT)
Age: 35
End: 2025-04-24

## 2025-04-24 VITALS
HEART RATE: 97 BPM | TEMPERATURE: 97.9 F | HEIGHT: 64.5 IN | WEIGHT: 258 LBS | SYSTOLIC BLOOD PRESSURE: 115 MMHG | DIASTOLIC BLOOD PRESSURE: 79 MMHG | BODY MASS INDEX: 43.51 KG/M2 | OXYGEN SATURATION: 98 %

## 2025-04-24 DIAGNOSIS — Z83.3 FAMILY HISTORY OF DIABETES MELLITUS: ICD-10-CM

## 2025-04-24 DIAGNOSIS — R73.03 PREDIABETES.: ICD-10-CM

## 2025-04-24 DIAGNOSIS — Z78.9 OTHER SPECIFIED HEALTH STATUS: ICD-10-CM

## 2025-04-24 DIAGNOSIS — D35.2 BENIGN NEOPLASM OF PITUITARY GLAND: ICD-10-CM

## 2025-04-24 PROCEDURE — 99215 OFFICE O/P EST HI 40 MIN: CPT

## 2025-04-24 RX ORDER — DULOXETINE HYDROCHLORIDE 60 MG/1
60 CAPSULE, DELAYED RELEASE ORAL
Refills: 0 | Status: ACTIVE | COMMUNITY

## 2025-04-24 RX ORDER — APREMILAST 30 MG/1
30 TABLET, FILM COATED ORAL
Refills: 0 | Status: ACTIVE | COMMUNITY

## 2025-04-24 RX ORDER — SEMAGLUTIDE 1.34 MG/ML
INJECTION, SOLUTION SUBCUTANEOUS
Refills: 0 | Status: ACTIVE | COMMUNITY

## 2025-05-13 ENCOUNTER — APPOINTMENT (OUTPATIENT)
Dept: OTOLARYNGOLOGY | Facility: CLINIC | Age: 35
End: 2025-05-13
Payer: COMMERCIAL

## 2025-05-13 VITALS
DIASTOLIC BLOOD PRESSURE: 79 MMHG | SYSTOLIC BLOOD PRESSURE: 115 MMHG | HEART RATE: 75 BPM | HEIGHT: 64 IN | BODY MASS INDEX: 44.05 KG/M2 | WEIGHT: 258 LBS

## 2025-05-13 DIAGNOSIS — J34.2 DEVIATED NASAL SEPTUM: ICD-10-CM

## 2025-05-13 DIAGNOSIS — D35.2 BENIGN NEOPLASM OF PITUITARY GLAND: ICD-10-CM

## 2025-05-13 PROCEDURE — 99204 OFFICE O/P NEW MOD 45 MIN: CPT | Mod: 25

## 2025-05-13 PROCEDURE — 31231 NASAL ENDOSCOPY DX: CPT

## 2025-05-15 ENCOUNTER — APPOINTMENT (OUTPATIENT)
Dept: OPHTHALMOLOGY | Facility: CLINIC | Age: 35
End: 2025-05-15
Payer: COMMERCIAL

## 2025-05-15 ENCOUNTER — NON-APPOINTMENT (OUTPATIENT)
Age: 35
End: 2025-05-15

## 2025-05-15 PROCEDURE — 92004 COMPRE OPH EXAM NEW PT 1/>: CPT

## 2025-05-29 ENCOUNTER — OUTPATIENT (OUTPATIENT)
Dept: OUTPATIENT SERVICES | Facility: HOSPITAL | Age: 35
LOS: 1 days | End: 2025-05-29
Payer: COMMERCIAL

## 2025-05-29 VITALS
OXYGEN SATURATION: 98 % | DIASTOLIC BLOOD PRESSURE: 72 MMHG | RESPIRATION RATE: 18 BRPM | HEIGHT: 66 IN | HEART RATE: 83 BPM | TEMPERATURE: 98 F | WEIGHT: 258.82 LBS | SYSTOLIC BLOOD PRESSURE: 110 MMHG

## 2025-05-29 DIAGNOSIS — J45.909 UNSPECIFIED ASTHMA, UNCOMPLICATED: ICD-10-CM

## 2025-05-29 DIAGNOSIS — Z29.9 ENCOUNTER FOR PROPHYLACTIC MEASURES, UNSPECIFIED: ICD-10-CM

## 2025-05-29 DIAGNOSIS — E11.9 TYPE 2 DIABETES MELLITUS WITHOUT COMPLICATIONS: ICD-10-CM

## 2025-05-29 DIAGNOSIS — Z01.818 ENCOUNTER FOR OTHER PREPROCEDURAL EXAMINATION: ICD-10-CM

## 2025-05-29 DIAGNOSIS — Z90.49 ACQUIRED ABSENCE OF OTHER SPECIFIED PARTS OF DIGESTIVE TRACT: Chronic | ICD-10-CM

## 2025-05-29 DIAGNOSIS — D35.2 BENIGN NEOPLASM OF PITUITARY GLAND: ICD-10-CM

## 2025-05-29 LAB
A1C WITH ESTIMATED AVERAGE GLUCOSE RESULT: 5.9 % — HIGH (ref 4–5.6)
ALBUMIN SERPL ELPH-MCNC: 3.7 G/DL — SIGNIFICANT CHANGE UP (ref 3.3–5.2)
ALP SERPL-CCNC: 75 U/L — SIGNIFICANT CHANGE UP (ref 40–120)
ALT FLD-CCNC: 8 U/L — SIGNIFICANT CHANGE UP
ANION GAP SERPL CALC-SCNC: 16 MMOL/L — SIGNIFICANT CHANGE UP (ref 5–17)
APTT BLD: 27.1 SEC — SIGNIFICANT CHANGE UP (ref 26.1–36.8)
AST SERPL-CCNC: 10 U/L — SIGNIFICANT CHANGE UP
BASOPHILS # BLD AUTO: 0.04 K/UL — SIGNIFICANT CHANGE UP (ref 0–0.2)
BASOPHILS NFR BLD AUTO: 0.5 % — SIGNIFICANT CHANGE UP (ref 0–2)
BILIRUB SERPL-MCNC: <0.2 MG/DL — LOW (ref 0.4–2)
BLD GP AB SCN SERPL QL: SIGNIFICANT CHANGE UP
BUN SERPL-MCNC: 8 MG/DL — SIGNIFICANT CHANGE UP (ref 8–20)
CALCIUM SERPL-MCNC: 8.3 MG/DL — LOW (ref 8.4–10.5)
CHLORIDE SERPL-SCNC: 102 MMOL/L — SIGNIFICANT CHANGE UP (ref 96–108)
CO2 SERPL-SCNC: 21 MMOL/L — LOW (ref 22–29)
CREAT SERPL-MCNC: 0.47 MG/DL — LOW (ref 0.5–1.3)
EGFR: 127 ML/MIN/1.73M2 — SIGNIFICANT CHANGE UP
EGFR: 127 ML/MIN/1.73M2 — SIGNIFICANT CHANGE UP
EOSINOPHIL # BLD AUTO: 0.15 K/UL — SIGNIFICANT CHANGE UP (ref 0–0.5)
EOSINOPHIL NFR BLD AUTO: 2 % — SIGNIFICANT CHANGE UP (ref 0–6)
ESTIMATED AVERAGE GLUCOSE: 123 MG/DL — HIGH (ref 68–114)
GLUCOSE SERPL-MCNC: 89 MG/DL — SIGNIFICANT CHANGE UP (ref 70–99)
HCG SERPL-ACNC: <4 MIU/ML — SIGNIFICANT CHANGE UP
HCT VFR BLD CALC: 38 % — SIGNIFICANT CHANGE UP (ref 34.5–45)
HGB BLD-MCNC: 12 G/DL — SIGNIFICANT CHANGE UP (ref 11.5–15.5)
IMM GRANULOCYTES # BLD AUTO: 0.03 K/UL — SIGNIFICANT CHANGE UP (ref 0–0.07)
IMM GRANULOCYTES NFR BLD AUTO: 0.4 % — SIGNIFICANT CHANGE UP (ref 0–0.9)
INR BLD: 1 RATIO — SIGNIFICANT CHANGE UP (ref 0.85–1.16)
LYMPHOCYTES # BLD AUTO: 2.23 K/UL — SIGNIFICANT CHANGE UP (ref 1–3.3)
LYMPHOCYTES NFR BLD AUTO: 29.5 % — SIGNIFICANT CHANGE UP (ref 13–44)
MCHC RBC-ENTMCNC: 26.3 PG — LOW (ref 27–34)
MCHC RBC-ENTMCNC: 31.6 G/DL — LOW (ref 32–36)
MCV RBC AUTO: 83.3 FL — SIGNIFICANT CHANGE UP (ref 80–100)
MONOCYTES # BLD AUTO: 0.51 K/UL — SIGNIFICANT CHANGE UP (ref 0–0.9)
MONOCYTES NFR BLD AUTO: 6.7 % — SIGNIFICANT CHANGE UP (ref 2–14)
MRSA PCR RESULT.: SIGNIFICANT CHANGE UP
NEUTROPHILS # BLD AUTO: 4.61 K/UL — SIGNIFICANT CHANGE UP (ref 1.8–7.4)
NEUTROPHILS NFR BLD AUTO: 60.9 % — SIGNIFICANT CHANGE UP (ref 43–77)
NRBC # BLD AUTO: 0 K/UL — SIGNIFICANT CHANGE UP (ref 0–0)
NRBC # FLD: 0 K/UL — SIGNIFICANT CHANGE UP (ref 0–0)
NRBC BLD AUTO-RTO: 0 /100 WBCS — SIGNIFICANT CHANGE UP (ref 0–0)
PLATELET # BLD AUTO: 371 K/UL — SIGNIFICANT CHANGE UP (ref 150–400)
PMV BLD: 10 FL — SIGNIFICANT CHANGE UP (ref 7–13)
POTASSIUM SERPL-MCNC: 3.8 MMOL/L — SIGNIFICANT CHANGE UP (ref 3.5–5.3)
POTASSIUM SERPL-SCNC: 3.8 MMOL/L — SIGNIFICANT CHANGE UP (ref 3.5–5.3)
PROT SERPL-MCNC: 6.6 G/DL — SIGNIFICANT CHANGE UP (ref 6.6–8.7)
PROTHROM AB SERPL-ACNC: 11.6 SEC — SIGNIFICANT CHANGE UP (ref 9.9–13.4)
RBC # BLD: 4.56 M/UL — SIGNIFICANT CHANGE UP (ref 3.8–5.2)
RBC # FLD: 13.8 % — SIGNIFICANT CHANGE UP (ref 10.3–14.5)
S AUREUS DNA NOSE QL NAA+PROBE: DETECTED
SODIUM SERPL-SCNC: 139 MMOL/L — SIGNIFICANT CHANGE UP (ref 135–145)
WBC # BLD: 7.57 K/UL — SIGNIFICANT CHANGE UP (ref 3.8–10.5)
WBC # FLD AUTO: 7.57 K/UL — SIGNIFICANT CHANGE UP (ref 3.8–10.5)

## 2025-05-29 PROCEDURE — 86901 BLOOD TYPING SEROLOGIC RH(D): CPT

## 2025-05-29 PROCEDURE — 71046 X-RAY EXAM CHEST 2 VIEWS: CPT | Mod: 26

## 2025-05-29 PROCEDURE — 85025 COMPLETE CBC W/AUTO DIFF WBC: CPT

## 2025-05-29 PROCEDURE — 87640 STAPH A DNA AMP PROBE: CPT

## 2025-05-29 PROCEDURE — 83036 HEMOGLOBIN GLYCOSYLATED A1C: CPT

## 2025-05-29 PROCEDURE — 85610 PROTHROMBIN TIME: CPT

## 2025-05-29 PROCEDURE — 36415 COLL VENOUS BLD VENIPUNCTURE: CPT

## 2025-05-29 PROCEDURE — 93005 ELECTROCARDIOGRAM TRACING: CPT

## 2025-05-29 PROCEDURE — 80053 COMPREHEN METABOLIC PANEL: CPT

## 2025-05-29 PROCEDURE — 86850 RBC ANTIBODY SCREEN: CPT

## 2025-05-29 PROCEDURE — 71046 X-RAY EXAM CHEST 2 VIEWS: CPT

## 2025-05-29 PROCEDURE — 86900 BLOOD TYPING SEROLOGIC ABO: CPT

## 2025-05-29 PROCEDURE — 85730 THROMBOPLASTIN TIME PARTIAL: CPT

## 2025-05-29 PROCEDURE — 84702 CHORIONIC GONADOTROPIN TEST: CPT

## 2025-05-29 PROCEDURE — G0463: CPT

## 2025-05-29 PROCEDURE — 87641 MR-STAPH DNA AMP PROBE: CPT

## 2025-05-29 PROCEDURE — 93010 ELECTROCARDIOGRAM REPORT: CPT

## 2025-05-29 NOTE — H&P PST ADULT - NSANTHTOTALSCORECAL_ENT_A_CORE
Patient given results for HIV/STI testing. STD test results, Gonorrhea, Chlamydia, and HIV were negative. RPR test result reactive, 1:2 titer. Syphilis treatment completed (Benzathine 2.4 Million units with 3 IM injections).     Copies of test results reviewed and given to the patient. Patient encouraged to return to clinic in 3 months for STD follow-up testing. Appointment scheduled for 7/10/25.    Patient acknowledged understanding of such. Patient encouraged to return with any new symptoms, new sexual partners and at least once yearly.  
0

## 2025-05-29 NOTE — H&P PST ADULT - HISTORY OF PRESENT ILLNESS
Pt is a pleasant 33 y/o female, PMH DM2, fibromyalgia, asthma, psoriatic arthritis, presents to PST with c/o benign neoplasm of pituitary gland.  Per surgeon's note "She originally presented to [facility] in 12/2023 with headache, and she was found to have a sellar mass (2.2 x 1.0 x 1.7 cm). She was planned to undergo surgical resection, but she had follow up MRI which showed a decrease in the lesion, now 1.5 x 0.9 x 0.6 cm. Her intervention was deferred, and she now presents for follow up. She had a repeat MRI w/wo which showed stable size of lesion (1.5 x 0.9 x 0.7 cm) but with contrast hyperintensity within the R side of the lesion (previously L side hyperintensity). She admits to intermittent headaches, often with her menstrual cycle. She also admitted to an episode of flashing lights within her peripheral vision which has now resolved".    Pt reports feeling generally well, denies recent fever/cough/cold, infection or abx use.  Currently pt denies numbness/tingling/weakness, difficulty ambulating / speaking, dizziness, visual changes.  Scheduled for Endoscopic Transphenoidal Resection of Pituitary Mass on 6.18.2025 with Dr. Dominguez, pending medical and endocrinology optimization.  Pt is a pleasant 35 y/o female, PMH Patient instructed to hold metformin for 24 hours prior to procedure, discuss lantus dosing PM prior to surgery with PCP/endocrinologist, patient verbalized understanding.2, fibromyalgia, asthma, psoriatic arthritis, presents to PST with c/o benign neoplasm of pituitary gland.  Per surgeon's note "She originally presented to [facility] in 12/2023 with headache, and she was found to have a sellar mass (2.2 x 1.0 x 1.7 cm). She was planned to undergo surgical resection, but she had follow up MRI which showed a decrease in the lesion, now 1.5 x 0.9 x 0.6 cm. Her intervention was deferred, and she now presents for follow up. She had a repeat MRI w/wo which showed stable size of lesion (1.5 x 0.9 x 0.7 cm) but with contrast hyperintensity within the R side of the lesion (previously L side hyperintensity). She admits to intermittent headaches, often with her menstrual cycle. She also admitted to an episode of flashing lights within her peripheral vision which has now resolved".    Pt reports feeling generally well, denies recent fever/cough/cold, infection or abx use.  Currently pt denies numbness/tingling/weakness, difficulty ambulating / speaking, dizziness, visual changes.  Scheduled for Endoscopic Transphenoidal Resection of Pituitary Mass on 6.18.2025 with Dr. Dominguez, pending medical and endocrinology optimization.  Pt is a pleasant 33 y/o female, PMH DM2, fibromyalgia, asthma, psoriatic arthritis, presents to PST with c/o benign neoplasm of pituitary gland.  Per surgeon's note "She originally presented to [facility] in 12/2023 with headache, and she was found to have a sellar mass (2.2 x 1.0 x 1.7 cm). She was planned to undergo surgical resection, but she had follow up MRI which showed a decrease in the lesion, now 1.5 x 0.9 x 0.6 cm. Her intervention was deferred, and she now presents for follow up. She had a repeat MRI w/wo which showed stable size of lesion (1.5 x 0.9 x 0.7 cm) but with contrast hyperintensity within the R side of the lesion (previously L side hyperintensity). She admits to intermittent headaches, often with her menstrual cycle. She also admitted to an episode of flashing lights within her peripheral vision which has now resolved".  Pt explains concern mass may have grown, now scheduled for intervention.  Pt reports feeling generally well, denies recent fever/cough/cold, infection or abx use.  Currently pt denies numbness/tingling/weakness, difficulty ambulating / speaking, dizziness, visual changes.  Scheduled for Endoscopic Transphenoidal Resection of Pituitary Mass on 6.18.2025 with Dr. Dominguez, pending medical and endocrinology optimization.

## 2025-05-29 NOTE — H&P PST ADULT - PROBLEM SELECTOR PLAN 1
EKG, labs pending  Patient educated on surgical scrub, COVID testing, preadmission instructions, medical clearance and day of procedure medications, verbalizes understanding.   Pt instructed to stop vitamins/supplements/herbal medications/ASA/NSAIDS for one week prior to surgery and discuss with PMD.   Written & verbal instructions provided to pt for all education/instructions, questions encouraged/addressed, pt verbalized understandings of all education/instructions, teach back method utilized Scheduled for Endoscopic Transphenoidal Resection of Pituitary Mass on 6.18.2025 with Dr. Dominguez, pending medical and endocrinology optimization.   CXR, EKG, labs pending  Patient educated on surgical scrub, COVID testing, preadmission instructions, medical clearance and day of procedure medications, verbalizes understanding.   Pt instructed to stop vitamins/supplements/herbal medications/ASA/NSAIDS for one week prior to surgery and discuss with PMD.   Written & verbal instructions provided to pt for all education/instructions, questions encouraged/addressed, pt verbalized understandings of all education/instructions, teach back method utilized

## 2025-05-29 NOTE — H&P PST ADULT - PROBLEM SELECTOR PLAN 4
Total Score [    4    ]    Caprini Score 3-6: Moderate Risk , pharmacologic VTE prophylaxis is indicated for most patients (in the absence of contraindications)

## 2025-05-29 NOTE — H&P PST ADULT - ASSESSMENT
CAPRINI SCORE    AGE RELATED RISK FACTORS                                                             [ ] Age 41-60 years                                            (1 Point)  [ ] Age: 61-74 years                                           (2 Points)                 [ ] Age= 75 years                                                (3 Points)             DISEASE RELATED RISK FACTORS                                                       [ ] Edema in the lower extremities                 (1 Point)                     [ ] Varicose veins                                               (1 Point)                                 [ X] BMI > 25 Kg/m2                                            (1 Point)                                  [ ] Serious infection (ie PNA)                            (1 Point)                     [ ] Lung disease ( COPD, Emphysema)            (1 Point)                                                                          [ ] Acute myocardial infarction                         (1 Point)                  [ ] Congestive heart failure (in the previous month)  (1 Point)         [ ] Inflammatory bowel disease                            (1 Point)                  [ ] Central venous access, PICC or Port               (2 points)       (within the last month)                                                                [ ] Stroke (in the previous month)                        (5 Points)    [ ] Previous or present malignancy                       (2 points)                                                                                                                                                         HEMATOLOGY RELATED FACTORS                                                         [ ] Prior episodes of VTE                                     (3 Points)                     [ ] Positive family history for VTE                      (3 Points)                  [ ] Prothrombin 70472 A                                     (3 Points)                     [ ] Factor V Leiden                                                (3 Points)                        [ ] Lupus anticoagulants                                      (3 Points)                                                           [ ] Anticardiolipin antibodies                              (3 Points)                                                       [ ] High homocysteine in the blood                   (3 Points)                                             [ ] Other congenital or acquired thrombophilia      (3 Points)                                                [ ] Heparin induced thrombocytopenia                  (3 Points)                                        MOBILITY RELATED FACTORS  [ ] Bed rest                                                         (1 Point)  [ ] Plaster cast                                                    (2 points)  [ ] Bed bound for more than 72 hours           (2 Points)    GENDER SPECIFIC FACTORS  [ ] Pregnancy or had a baby within the last month   (1 Point)  [ ] Post-partum < 6 weeks                                   (1 Point)  [ ] Hormonal therapy  or oral contraception   (1 Point)  [ ] History of pregnancy complications              (1 point)  [ ] Unexplained or recurrent              (1 Point)    OTHER RISK FACTORS                                           (1 Point)  [ X] BMI >40, smoking, diabetes requiring insulin, chemotherapy  blood transfusions and length of surgery over 2 hours    SURGERY RELATED RISK FACTORS  [ ]  Section within the last month     (1 Point)  [ ] Minor surgery                                                  (1 Point)  [ ] Arthroscopic surgery                                       (2 Points)  [X ] Planned major surgery lasting more            (2 Points)      than 45 minutes     [ ] Elective hip or knee joint replacement       (5 points)       surgery                                                TRAUMA RELATED RISK FACTORS  [ ] Fracture of the hip, pelvis, or leg                       (5 Points)  [ ] Spinal cord injury resulting in paralysis             (5 points)       (in the previous month)    [ ] Paralysis  (less than 1 month)                             (5 Points)  [ ] Multiple Trauma within 1 month                        (5 Points)    Total Score [    4    ]    Caprini Score 0-2: Low Risk, NO VTE prophylaxis required for most patients, encourage ambulation  Caprini Score 3-6: Moderate Risk , pharmacologic VTE prophylaxis is indicated for most patients (in the absence of contraindications)  Caprini Score Greater than or =7: High risk, pharmocologic VTE prophylaxis indicated for most patients (in the absence of contraindications)      OPIOID RISK TOOL    NAY EACH BOX THAT APPLIES AND ADD TOTALS AT THE END    FAMILY HISTORY OF SUBSTANCE ABUSE                 FEMALE         MALE                                                Alcohol                             [  ]1 pt          [  ]3pts                                               Illegal Durgs                     [  ]2 pts        [  ]3pts                                               Rx Drugs                           [  ]4 pts        [  ]4 pts    PERSONAL HISTORY OF SUBSTANCE ABUSE                                                                                          Alcohol                             [  ]3 pts       [  ]3 pts                                               Illegal Drugs                     [  ]4 pts        [  ]4 pts                                               Rx Drugs                           [  ]5 pts        [  ]5 pts    AGE BETWEEN 16-45 YEARS                                      [  ]1 pt         [  ]1 pt    HISTORY OF PREADOLESCENT   SEXUAL ABUSE                                                             [  ]3 pts        [  ]0pts    PSYCHOLOGICAL DISEASE                     ADD, OCD, Bipolar, Schizophrenia        [  ]2 pts         [  ]2 pts                      Depression                                               [  ]1 pt           [  ]1 pt           SCORING TOTAL   (0)                                     A score of 3 or lower indicated LOW risk for future opioid abuse  A score of 4 to 7 indicated moderate risk for future opioid abuse  A score of 8 or higher indicates a high risk for opioid abuse                             Pt is a pleasant 33 y/o female, PMH DM2, fibromyalgia, asthma, psoriatic arthritis, presents to PST with c/o benign neoplasm of pituitary gland.  Per surgeon's note "She originally presented to [facility] in 2023 with headache, and she was found to have a sellar mass (2.2 x 1.0 x 1.7 cm). She was planned to undergo surgical resection, but she had follow up MRI which showed a decrease in the lesion, now 1.5 x 0.9 x 0.6 cm. Her intervention was deferred, and she now presents for follow up. She had a repeat MRI w/wo which showed stable size of lesion (1.5 x 0.9 x 0.7 cm) but with contrast hyperintensity within the R side of the lesion (previously L side hyperintensity). She admits to intermittent headaches, often with her menstrual cycle. She also admitted to an episode of flashing lights within her peripheral vision which has now resolved".  Pt explains concern mass may have grown, now scheduled for intervention.  Pt reports feeling generally well, denies recent fever/cough/cold, infection or abx use.  Currently pt denies numbness/tingling/weakness, difficulty ambulating / speaking, dizziness, visual changes.  Scheduled for Endoscopic Transphenoidal Resection of Pituitary Mass on 2025 with Dr. Dominguez, pending medical and endocrinology optimization.     CAPRINI SCORE    AGE RELATED RISK FACTORS                                                             [ ] Age 41-60 years                                            (1 Point)  [ ] Age: 61-74 years                                           (2 Points)                 [ ] Age= 75 years                                                (3 Points)             DISEASE RELATED RISK FACTORS                                                       [ ] Edema in the lower extremities                 (1 Point)                     [ ] Varicose veins                                               (1 Point)                                 [ X] BMI > 25 Kg/m2                                            (1 Point)                                  [ ] Serious infection (ie PNA)                            (1 Point)                     [ ] Lung disease ( COPD, Emphysema)            (1 Point)                                                                          [ ] Acute myocardial infarction                         (1 Point)                  [ ] Congestive heart failure (in the previous month)  (1 Point)         [ ] Inflammatory bowel disease                            (1 Point)                  [ ] Central venous access, PICC or Port               (2 points)       (within the last month)                                                                [ ] Stroke (in the previous month)                        (5 Points)    [ ] Previous or present malignancy                       (2 points)                                                                                                                                                         HEMATOLOGY RELATED FACTORS                                                         [ ] Prior episodes of VTE                                     (3 Points)                     [ ] Positive family history for VTE                      (3 Points)                  [ ] Prothrombin 12312 A                                     (3 Points)                     [ ] Factor V Leiden                                                (3 Points)                        [ ] Lupus anticoagulants                                      (3 Points)                                                           [ ] Anticardiolipin antibodies                              (3 Points)                                                       [ ] High homocysteine in the blood                   (3 Points)                                             [ ] Other congenital or acquired thrombophilia      (3 Points)                                                [ ] Heparin induced thrombocytopenia                  (3 Points)                                        MOBILITY RELATED FACTORS  [ ] Bed rest                                                         (1 Point)  [ ] Plaster cast                                                    (2 points)  [ ] Bed bound for more than 72 hours           (2 Points)    GENDER SPECIFIC FACTORS  [ ] Pregnancy or had a baby within the last month   (1 Point)  [ ] Post-partum < 6 weeks                                   (1 Point)  [ ] Hormonal therapy  or oral contraception   (1 Point)  [ ] History of pregnancy complications              (1 point)  [ ] Unexplained or recurrent              (1 Point)    OTHER RISK FACTORS                                           (1 Point)  [ X] BMI >40, smoking, diabetes requiring insulin, chemotherapy  blood transfusions and length of surgery over 2 hours    SURGERY RELATED RISK FACTORS  [ ]  Section within the last month     (1 Point)  [ ] Minor surgery                                                  (1 Point)  [ ] Arthroscopic surgery                                       (2 Points)  [X ] Planned major surgery lasting more            (2 Points)      than 45 minutes     [ ] Elective hip or knee joint replacement       (5 points)       surgery                                                TRAUMA RELATED RISK FACTORS  [ ] Fracture of the hip, pelvis, or leg                       (5 Points)  [ ] Spinal cord injury resulting in paralysis             (5 points)       (in the previous month)    [ ] Paralysis  (less than 1 month)                             (5 Points)  [ ] Multiple Trauma within 1 month                        (5 Points)    Total Score [    4    ]    Caprini Score 0-2: Low Risk, NO VTE prophylaxis required for most patients, encourage ambulation  Caprini Score 3-6: Moderate Risk , pharmacologic VTE prophylaxis is indicated for most patients (in the absence of contraindications)  Caprini Score Greater than or =7: High risk, pharmocologic VTE prophylaxis indicated for most patients (in the absence of contraindications)      OPIOID RISK TOOL    NAY EACH BOX THAT APPLIES AND ADD TOTALS AT THE END    FAMILY HISTORY OF SUBSTANCE ABUSE                 FEMALE         MALE                                                Alcohol                             [  ]1 pt          [  ]3pts                                               Illegal Durgs                     [  ]2 pts        [  ]3pts                                               Rx Drugs                           [  ]4 pts        [  ]4 pts    PERSONAL HISTORY OF SUBSTANCE ABUSE                                                                                          Alcohol                             [  ]3 pts       [  ]3 pts                                               Illegal Drugs                     [  ]4 pts        [  ]4 pts                                               Rx Drugs                           [  ]5 pts        [  ]5 pts    AGE BETWEEN 16-45 YEARS                                      [  ]1 pt         [  ]1 pt    HISTORY OF PREADOLESCENT   SEXUAL ABUSE                                                             [  ]3 pts        [  ]0pts    PSYCHOLOGICAL DISEASE                     ADD, OCD, Bipolar, Schizophrenia        [  ]2 pts         [  ]2 pts                      Depression                                               [  ]1 pt           [  ]1 pt           SCORING TOTAL   (0)                                     A score of 3 or lower indicated LOW risk for future opioid abuse  A score of 4 to 7 indicated moderate risk for future opioid abuse  A score of 8 or higher indicates a high risk for opioid abuse

## 2025-05-29 NOTE — H&P PST ADULT - PROBLEM SELECTOR PLAN 3
HgA1c pending  FS ordered DOS  aware no diabetic medications morning of procedure  instructed LAST DOSE Ozempic 6.10.2025, educated in detail on importance, pt verbalized understandings  Written & verbal instructions provided to pt for all education/instructions, questions encouraged/addressed, pt verbalized understandings of all education/instructions, teach back method utilized

## 2025-05-29 NOTE — H&P PST ADULT - PROBLEM SELECTOR PLAN 2
EKG labs pending  aware may use inhaler if needed morning of procedure, instructed to bring inhaler DOS  Written & verbal instructions provided to pt for all education/instructions, questions encouraged/addressed, pt verbalized understandings of all education/instructions, teach back method utilized

## 2025-05-29 NOTE — H&P PST ADULT - ENDOCRINE COMMENTS
braden DM2, managed by hx DM2, managed by endocrinologist, ADRIANNE Tracey, obtaining endocrinology clearance

## 2025-05-29 NOTE — H&P PST ADULT - NSICDXPASTMEDICALHX_GEN_ALL_CORE_FT
PAST MEDICAL HISTORY:  Anemia     Arthritis     Asthma     Benign neoplasm of pituitary gland     DM (diabetes mellitus)     Fibromyalgia      PAST MEDICAL HISTORY:  Anemia     Arthritis     Asthma     Benign neoplasm of pituitary gland     DM (diabetes mellitus)     Fibromyalgia     Psoriatic arthritis

## 2025-05-30 RX ORDER — MUPIROCIN CALCIUM 20 MG/G
1 CREAM TOPICAL
Qty: 1 | Refills: 0
Start: 2025-05-30 | End: 2025-06-03

## 2025-06-02 ENCOUNTER — APPOINTMENT (OUTPATIENT)
Dept: MRI IMAGING | Facility: CLINIC | Age: 35
End: 2025-06-02
Payer: COMMERCIAL

## 2025-06-02 PROCEDURE — A9585: CPT | Mod: JW

## 2025-06-02 PROCEDURE — 70553 MRI BRAIN STEM W/O & W/DYE: CPT

## 2025-06-09 ENCOUNTER — LABORATORY RESULT (OUTPATIENT)
Age: 35
End: 2025-06-09

## 2025-06-09 ENCOUNTER — APPOINTMENT (OUTPATIENT)
Dept: OPHTHALMOLOGY | Facility: CLINIC | Age: 35
End: 2025-06-09
Payer: COMMERCIAL

## 2025-06-09 ENCOUNTER — APPOINTMENT (OUTPATIENT)
Dept: OBGYN | Facility: CLINIC | Age: 35
End: 2025-06-09
Payer: COMMERCIAL

## 2025-06-09 ENCOUNTER — NON-APPOINTMENT (OUTPATIENT)
Age: 35
End: 2025-06-09

## 2025-06-09 VITALS
SYSTOLIC BLOOD PRESSURE: 125 MMHG | WEIGHT: 250 LBS | BODY MASS INDEX: 42.68 KG/M2 | DIASTOLIC BLOOD PRESSURE: 84 MMHG | HEIGHT: 64 IN

## 2025-06-09 PROBLEM — Z01.419 ENCOUNTER FOR WELL WOMAN EXAM WITH ROUTINE GYNECOLOGICAL EXAM: Status: ACTIVE | Noted: 2025-06-09

## 2025-06-09 PROBLEM — D35.2 BENIGN NEOPLASM OF PITUITARY GLAND: Chronic | Status: ACTIVE | Noted: 2025-05-29

## 2025-06-09 PROBLEM — J45.909 UNSPECIFIED ASTHMA, UNCOMPLICATED: Chronic | Status: ACTIVE | Noted: 2025-05-29

## 2025-06-09 PROBLEM — D64.9 ANEMIA, UNSPECIFIED: Chronic | Status: ACTIVE | Noted: 2025-05-29

## 2025-06-09 PROBLEM — L40.50 ARTHROPATHIC PSORIASIS, UNSPECIFIED: Chronic | Status: ACTIVE | Noted: 2025-05-29

## 2025-06-09 PROBLEM — Z11.3 ROUTINE SCREENING FOR STI (SEXUALLY TRANSMITTED INFECTION): Status: ACTIVE | Noted: 2025-06-09

## 2025-06-09 PROCEDURE — 92083 EXTENDED VISUAL FIELD XM: CPT

## 2025-06-09 PROCEDURE — 99385 PREV VISIT NEW AGE 18-39: CPT

## 2025-06-10 LAB
HBV SURFACE AG SER QL: NONREACTIVE
HCV AB SER QL: NONREACTIVE
HCV S/CO RATIO: 0.12 S/CO
HIV1+2 AB SPEC QL IA.RAPID: NONREACTIVE
HPV HIGH+LOW RISK DNA PNL CVX: NOT DETECTED
T PALLIDUM AB SER QL IA: NEGATIVE

## 2025-06-12 LAB — CYTOLOGY CVX/VAG DOC THIN PREP: NORMAL

## 2025-06-15 RX ORDER — POVIDONE-IODINE 7.5 %
1 SOLUTION, NON-ORAL TOPICAL ONCE
Refills: 0 | Status: COMPLETED | OUTPATIENT
Start: 2025-06-18 | End: 2025-06-18

## 2025-06-18 ENCOUNTER — INPATIENT (INPATIENT)
Facility: HOSPITAL | Age: 35
LOS: 1 days | Discharge: HOME | DRG: 614 | End: 2025-06-20
Attending: STUDENT IN AN ORGANIZED HEALTH CARE EDUCATION/TRAINING PROGRAM | Admitting: STUDENT IN AN ORGANIZED HEALTH CARE EDUCATION/TRAINING PROGRAM
Payer: COMMERCIAL

## 2025-06-18 ENCOUNTER — APPOINTMENT (OUTPATIENT)
Dept: NEUROSURGERY | Facility: HOSPITAL | Age: 35
End: 2025-06-18

## 2025-06-18 ENCOUNTER — RESULT REVIEW (OUTPATIENT)
Age: 35
End: 2025-06-18

## 2025-06-18 ENCOUNTER — APPOINTMENT (OUTPATIENT)
Dept: OTOLARYNGOLOGY | Facility: HOSPITAL | Age: 35
End: 2025-06-18

## 2025-06-18 VITALS
TEMPERATURE: 97 F | SYSTOLIC BLOOD PRESSURE: 149 MMHG | OXYGEN SATURATION: 100 % | HEART RATE: 86 BPM | DIASTOLIC BLOOD PRESSURE: 83 MMHG | RESPIRATION RATE: 16 BRPM | HEIGHT: 66 IN | WEIGHT: 258.82 LBS

## 2025-06-18 DIAGNOSIS — Z90.49 ACQUIRED ABSENCE OF OTHER SPECIFIED PARTS OF DIGESTIVE TRACT: Chronic | ICD-10-CM

## 2025-06-18 DIAGNOSIS — D35.2 BENIGN NEOPLASM OF PITUITARY GLAND: ICD-10-CM

## 2025-06-18 LAB
ACTH SER-ACNC: 33.3 PG/ML — SIGNIFICANT CHANGE UP (ref 7.2–63.3)
ANION GAP SERPL CALC-SCNC: 21 MMOL/L — HIGH (ref 5–17)
BLD GP AB SCN SERPL QL: SIGNIFICANT CHANGE UP
BUN SERPL-MCNC: 6.5 MG/DL — LOW (ref 8–20)
CALCIUM SERPL-MCNC: 7.8 MG/DL — LOW (ref 8.4–10.5)
CHLORIDE SERPL-SCNC: 99 MMOL/L — SIGNIFICANT CHANGE UP (ref 96–108)
CO2 SERPL-SCNC: 15 MMOL/L — LOW (ref 22–29)
CREAT SERPL-MCNC: 0.5 MG/DL — SIGNIFICANT CHANGE UP (ref 0.5–1.3)
EGFR: 125 ML/MIN/1.73M2 — SIGNIFICANT CHANGE UP
EGFR: 125 ML/MIN/1.73M2 — SIGNIFICANT CHANGE UP
FSH SERPL-MCNC: 0.3 IU/L — SIGNIFICANT CHANGE UP
GH SERPL-MCNC: 1.78 NG/ML — SIGNIFICANT CHANGE UP (ref 0.12–9.88)
GLUCOSE BLDC GLUCOMTR-MCNC: 122 MG/DL — HIGH (ref 70–99)
GLUCOSE BLDC GLUCOMTR-MCNC: 131 MG/DL — HIGH (ref 70–99)
GLUCOSE BLDC GLUCOMTR-MCNC: 147 MG/DL — HIGH (ref 70–99)
GLUCOSE BLDC GLUCOMTR-MCNC: 163 MG/DL — HIGH (ref 70–99)
GLUCOSE BLDC GLUCOMTR-MCNC: 254 MG/DL — HIGH (ref 70–99)
GLUCOSE SERPL-MCNC: 161 MG/DL — HIGH (ref 70–99)
HCT VFR BLD CALC: 39.3 % — SIGNIFICANT CHANGE UP (ref 34.5–45)
HGB BLD-MCNC: 12.5 G/DL — SIGNIFICANT CHANGE UP (ref 11.5–15.5)
MAGNESIUM SERPL-MCNC: 2.1 MG/DL — SIGNIFICANT CHANGE UP (ref 1.6–2.6)
MCHC RBC-ENTMCNC: 27.1 PG — SIGNIFICANT CHANGE UP (ref 27–34)
MCHC RBC-ENTMCNC: 31.8 G/DL — LOW (ref 32–36)
MCV RBC AUTO: 85.1 FL — SIGNIFICANT CHANGE UP (ref 80–100)
NRBC # BLD AUTO: 0 K/UL — SIGNIFICANT CHANGE UP (ref 0–0)
NRBC # FLD: 0 K/UL — SIGNIFICANT CHANGE UP (ref 0–0)
NRBC BLD AUTO-RTO: 0 /100 WBCS — SIGNIFICANT CHANGE UP (ref 0–0)
PHOSPHATE SERPL-MCNC: 3.6 MG/DL — SIGNIFICANT CHANGE UP (ref 2.4–4.7)
PLATELET # BLD AUTO: 333 K/UL — SIGNIFICANT CHANGE UP (ref 150–400)
PMV BLD: 9.3 FL — SIGNIFICANT CHANGE UP (ref 7–13)
POTASSIUM SERPL-MCNC: 4.3 MMOL/L — SIGNIFICANT CHANGE UP (ref 3.5–5.3)
POTASSIUM SERPL-SCNC: 4.3 MMOL/L — SIGNIFICANT CHANGE UP (ref 3.5–5.3)
PROLACTIN SERPL-MCNC: 29 NG/ML — HIGH (ref 3.4–24.1)
RBC # BLD: 4.62 M/UL — SIGNIFICANT CHANGE UP (ref 3.8–5.2)
RBC # FLD: 13.6 % — SIGNIFICANT CHANGE UP (ref 10.3–14.5)
SODIUM SERPL-SCNC: 135 MMOL/L — SIGNIFICANT CHANGE UP (ref 135–145)
T3 SERPL-MCNC: 246 NG/DL — HIGH (ref 80–200)
T4 AB SER-ACNC: 11.9 UG/DL — SIGNIFICANT CHANGE UP (ref 4.5–12)
TSH SERPL-MCNC: 3.21 UIU/ML — SIGNIFICANT CHANGE UP (ref 0.27–4.2)
WBC # BLD: 13.83 K/UL — HIGH (ref 3.8–10.5)
WBC # FLD AUTO: 13.83 K/UL — HIGH (ref 3.8–10.5)

## 2025-06-18 PROCEDURE — 62165 REMOVE PITUIT TUMOR W/SCOPE: CPT | Mod: 62

## 2025-06-18 PROCEDURE — 61781 SCAN PROC CRANIAL INTRA: CPT

## 2025-06-18 PROCEDURE — 61782 SCAN PROC CRANIAL EXTRA: CPT

## 2025-06-18 PROCEDURE — 99291 CRITICAL CARE FIRST HOUR: CPT | Mod: 24

## 2025-06-18 PROCEDURE — 88305 TISSUE EXAM BY PATHOLOGIST: CPT | Mod: 26

## 2025-06-18 DEVICE — SURGIFOAM 8 X 12.5CM X 10MM (100): Type: IMPLANTABLE DEVICE | Status: FUNCTIONAL

## 2025-06-18 DEVICE — SURGICEL 2 X 14": Type: IMPLANTABLE DEVICE | Status: FUNCTIONAL

## 2025-06-18 DEVICE — IMPLANTABLE DEVICE: Type: IMPLANTABLE DEVICE | Status: FUNCTIONAL

## 2025-06-18 DEVICE — SURGIFLO MATRIX WITH THROMBIN KIT: Type: IMPLANTABLE DEVICE | Status: FUNCTIONAL

## 2025-06-18 RX ORDER — CEFAZOLIN SODIUM IN 0.9 % NACL 3 G/100 ML
2000 INTRAVENOUS SOLUTION, PIGGYBACK (ML) INTRAVENOUS ONCE
Refills: 0 | Status: DISCONTINUED | OUTPATIENT
Start: 2025-06-18 | End: 2025-06-18

## 2025-06-18 RX ORDER — METOCLOPRAMIDE HCL 10 MG
10 TABLET ORAL ONCE
Refills: 0 | Status: COMPLETED | OUTPATIENT
Start: 2025-06-18 | End: 2025-06-18

## 2025-06-18 RX ORDER — MAGNESIUM SULFATE 500 MG/ML
2 SYRINGE (ML) INJECTION ONCE
Refills: 0 | Status: COMPLETED | OUTPATIENT
Start: 2025-06-18 | End: 2025-06-18

## 2025-06-18 RX ORDER — OXYCODONE HYDROCHLORIDE 30 MG/1
10 TABLET ORAL ONCE
Refills: 0 | Status: DISCONTINUED | OUTPATIENT
Start: 2025-06-18 | End: 2025-06-18

## 2025-06-18 RX ORDER — ALBUTEROL SULFATE 2.5 MG/3ML
2 VIAL, NEBULIZER (ML) INHALATION
Refills: 0 | DISCHARGE

## 2025-06-18 RX ORDER — ACETAMINOPHEN 500 MG/5ML
1000 LIQUID (ML) ORAL ONCE
Refills: 0 | Status: COMPLETED | OUTPATIENT
Start: 2025-06-18 | End: 2025-06-18

## 2025-06-18 RX ORDER — DEXTROSE 50 % IN WATER 50 %
25 SYRINGE (ML) INTRAVENOUS ONCE
Refills: 0 | Status: DISCONTINUED | OUTPATIENT
Start: 2025-06-18 | End: 2025-06-20

## 2025-06-18 RX ORDER — DULOXETINE 20 MG/1
60 CAPSULE, DELAYED RELEASE ORAL DAILY
Refills: 0 | Status: DISCONTINUED | OUTPATIENT
Start: 2025-06-18 | End: 2025-06-20

## 2025-06-18 RX ORDER — DEXTROSE 50 % IN WATER 50 %
12.5 SYRINGE (ML) INTRAVENOUS ONCE
Refills: 0 | Status: DISCONTINUED | OUTPATIENT
Start: 2025-06-18 | End: 2025-06-20

## 2025-06-18 RX ORDER — ALBUTEROL SULFATE 2.5 MG/3ML
2 VIAL, NEBULIZER (ML) INHALATION EVERY 6 HOURS
Refills: 0 | Status: DISCONTINUED | OUTPATIENT
Start: 2025-06-18 | End: 2025-06-20

## 2025-06-18 RX ORDER — DEXTROSE 50 % IN WATER 50 %
15 SYRINGE (ML) INTRAVENOUS ONCE
Refills: 0 | Status: DISCONTINUED | OUTPATIENT
Start: 2025-06-18 | End: 2025-06-20

## 2025-06-18 RX ORDER — APREMILAST 10 MG-20MG
1 KIT ORAL
Refills: 0 | DISCHARGE

## 2025-06-18 RX ORDER — SENNA 187 MG
2 TABLET ORAL AT BEDTIME
Refills: 0 | Status: DISCONTINUED | OUTPATIENT
Start: 2025-06-18 | End: 2025-06-20

## 2025-06-18 RX ORDER — ACETAMINOPHEN 500 MG/5ML
650 LIQUID (ML) ORAL EVERY 6 HOURS
Refills: 0 | Status: DISCONTINUED | OUTPATIENT
Start: 2025-06-18 | End: 2025-06-20

## 2025-06-18 RX ORDER — GLUCAGON 3 MG/1
1 POWDER NASAL ONCE
Refills: 0 | Status: DISCONTINUED | OUTPATIENT
Start: 2025-06-18 | End: 2025-06-20

## 2025-06-18 RX ORDER — CLINDAMYCIN PHOSPHATE 150 MG/ML
600 VIAL (ML) INJECTION EVERY 8 HOURS
Refills: 0 | Status: DISCONTINUED | OUTPATIENT
Start: 2025-06-18 | End: 2025-06-19

## 2025-06-18 RX ORDER — LABETALOL HYDROCHLORIDE 200 MG/1
10 TABLET, FILM COATED ORAL
Refills: 0 | Status: DISCONTINUED | OUTPATIENT
Start: 2025-06-18 | End: 2025-06-20

## 2025-06-18 RX ORDER — SODIUM CHLORIDE 9 G/1000ML
1000 INJECTION, SOLUTION INTRAVENOUS
Refills: 0 | Status: DISCONTINUED | OUTPATIENT
Start: 2025-06-18 | End: 2025-06-20

## 2025-06-18 RX ORDER — MONTELUKAST SODIUM 10 MG/1
10 TABLET ORAL DAILY
Refills: 0 | Status: DISCONTINUED | OUTPATIENT
Start: 2025-06-18 | End: 2025-06-20

## 2025-06-18 RX ORDER — SEMAGLUTIDE 1 MG/.5ML
1 INJECTION, SOLUTION SUBCUTANEOUS
Refills: 0 | DISCHARGE

## 2025-06-18 RX ORDER — MELATONIN 5 MG
10 TABLET ORAL AT BEDTIME
Refills: 0 | Status: DISCONTINUED | OUTPATIENT
Start: 2025-06-18 | End: 2025-06-20

## 2025-06-18 RX ORDER — POLYETHYLENE GLYCOL 3350 17 G/17G
17 POWDER, FOR SOLUTION ORAL DAILY
Refills: 0 | Status: DISCONTINUED | OUTPATIENT
Start: 2025-06-18 | End: 2025-06-20

## 2025-06-18 RX ORDER — HYDROMORPHONE/SOD CHLOR,ISO/PF 2 MG/10 ML
0.5 SYRINGE (ML) INJECTION EVERY 6 HOURS
Refills: 0 | Status: DISCONTINUED | OUTPATIENT
Start: 2025-06-18 | End: 2025-06-19

## 2025-06-18 RX ORDER — METFORMIN HYDROCHLORIDE 850 MG/1
1 TABLET ORAL
Refills: 0 | DISCHARGE

## 2025-06-18 RX ORDER — OXYCODONE HYDROCHLORIDE 30 MG/1
5 TABLET ORAL EVERY 4 HOURS
Refills: 0 | Status: DISCONTINUED | OUTPATIENT
Start: 2025-06-18 | End: 2025-06-20

## 2025-06-18 RX ORDER — DULOXETINE 20 MG/1
1 CAPSULE, DELAYED RELEASE ORAL
Refills: 0 | DISCHARGE

## 2025-06-18 RX ORDER — OXYCODONE HYDROCHLORIDE 30 MG/1
10 TABLET ORAL EVERY 4 HOURS
Refills: 0 | Status: DISCONTINUED | OUTPATIENT
Start: 2025-06-18 | End: 2025-06-20

## 2025-06-18 RX ORDER — INSULIN LISPRO 100 U/ML
INJECTION, SOLUTION INTRAVENOUS; SUBCUTANEOUS
Refills: 0 | Status: DISCONTINUED | OUTPATIENT
Start: 2025-06-18 | End: 2025-06-20

## 2025-06-18 RX ADMIN — Medication 100 MILLIGRAM(S): at 21:23

## 2025-06-18 RX ADMIN — Medication 150 GRAM(S): at 20:43

## 2025-06-18 RX ADMIN — INSULIN LISPRO 2: 100 INJECTION, SOLUTION INTRAVENOUS; SUBCUTANEOUS at 15:08

## 2025-06-18 RX ADMIN — OXYCODONE HYDROCHLORIDE 10 MILLIGRAM(S): 30 TABLET ORAL at 14:00

## 2025-06-18 RX ADMIN — Medication 10 MILLIGRAM(S): at 20:43

## 2025-06-18 RX ADMIN — Medication 400 MILLIGRAM(S): at 17:44

## 2025-06-18 RX ADMIN — Medication 10 MILLIGRAM(S): at 22:02

## 2025-06-18 RX ADMIN — Medication 70 MILLILITER(S): at 16:18

## 2025-06-18 RX ADMIN — Medication 1000 MILLILITER(S): at 21:16

## 2025-06-18 RX ADMIN — Medication 100 MILLIGRAM(S): at 16:17

## 2025-06-18 RX ADMIN — Medication 1000 MILLIGRAM(S): at 18:44

## 2025-06-18 RX ADMIN — OXYCODONE HYDROCHLORIDE 10 MILLIGRAM(S): 30 TABLET ORAL at 13:07

## 2025-06-18 RX ADMIN — Medication 1 APPLICATION(S): at 09:24

## 2025-06-18 NOTE — BRIEF OPERATIVE NOTE - NSICDXBRIEFPROCEDURE_GEN_ALL_CORE_FT
PROCEDURES:  Endoscopic transphenoidal or transnasal resection of pituitary tumor 18-Jun-2025 12:51:45  [place]

## 2025-06-18 NOTE — CONSULT NOTE ADULT - ASSESSMENT
ASSESSMENT:  33 y/o female, PMH DM2, fibromyalgia, asthma, psoriatic arthritis, presents with presents w/ benign neoplasm of pituitary gland, now s/p TSP for resection of pituitary tumor/cyst.   POD#0    PLAN:  Neuro   - Neuro/vital checks q1h   - CTH in am   - MR brain w/ w/o within 72 hours  - Pain control: PRN Tylenol/Oxycodone/Dilaudid  - DI watch; strict I/O's  - TSP precautions   - continue home cymbalta     Cards  - SBP <160   - PRN hydralazine | Labetalol     Pulm  - Face tent PRN periop   - NO NC    GI   - ADAT   - Bowel regimen: miralax, senna    Renal   - Jha - strict i/o's   - TOV in am   - IVF until adequate PO diet    Endo  - ISS  - endo labs ordered {TSH, T3,T4, HGH, IGF, etc}- will consult endo in am for further recs  - cortisol lvl 8 am     Heme  - SCD's   - No chemoppx periop     ID  - clindamycin while feng splints in place per ENT     Dispo: NSICU, PT/OT pending   D/w Dr. Dominguez

## 2025-06-18 NOTE — ASU PREOP CHECKLIST - TAMPON REMOVED
.n/a

## 2025-06-18 NOTE — CONSULT NOTE ADULT - SUBJECTIVE AND OBJECTIVE BOX
HPI:  Pt is a pleasant 33 y/o female, PMH DM2, fibromyalgia, asthma, psoriatic arthritis, presents with c/o benign neoplasm of pituitary gland.  Per surgeon's note "She originally presented to [facility] in 12/2023 with headache, and she was found to have a sellar mass (2.2 x 1.0 x 1.7 cm). She was planned to undergo surgical resection, but she had follow up MRI which showed a decrease in the lesion, now 1.5 x 0.9 x 0.6 cm. Her intervention was deferred, and she now presents for follow up. She had a repeat MRI w/wo which showed stable size of lesion (1.5 x 0.9 x 0.7 cm) but with contrast hyperintensity within the R side of the lesion (previously L side hyperintensity). She admits to intermittent headaches, often with her menstrual cycle. She also admitted to an episode of flashing lights within her peripheral vision which has now resolved".  Pt explains concern mass may have grown, now scheduled for intervention.  Pt reports feeling generally well, denies recent fever/cough/cold, infection or abx use.  Currently pt denies numbness/tingling/weakness, difficulty ambulating / speaking, dizziness, visual changes.  Pt presents today for Endoscopic Transphenoidal Resection of Pituitary Mass on 6.18.2025 with Dr. Dominguez.    Hospital Course: POD#0 s/p endoscopic transphenoidal resection of pituitary tumor, pt brought to NSICU for strict I/O's and q1h post op monitoring.     Vital Signs Last 24 Hrs  T(C): 36.2 (18 Jun 2025 08:42), Max: 36.2 (18 Jun 2025 08:42)  T(F): 97.2 (18 Jun 2025 08:42), Max: 97.2 (18 Jun 2025 08:42)  HR: 86 (18 Jun 2025 08:42) (86 - 86)  BP: 149/83 (18 Jun 2025 08:42) (149/83 - 149/83)  BP(mean): --  RR: 16 (18 Jun 2025 08:42) (16 - 16)  SpO2: 100% (18 Jun 2025 08:42) (100% - 100%)    Parameters below as of 18 Jun 2025 08:42  Patient On (Oxygen Delivery Method): room air        I&O's Summary      PHYSICAL EXAM:  General: patient seen laying supine in bed in NAD  Neuro: AAOx3, FC, OE spontaneously, speech clear and fluent, CNII-XI grossly intact, face symmetric, no pronator drift, strength 5/5 b/l UE and LE, sensation grossly intact to light touch throughout  HEENT: PERRL, EOMI  Neck: supple  Cardiac: RRR, S1S2  Pulmonary: chest rise symmetric  Abdomen: soft, nontender, nondistended  Ext: perfusing well  Skin: warm, dry      LABS:      CAPILLARY BLOOD GLUCOSE      POCT Blood Glucose.: 147 mg/dL (18 Jun 2025 12:50)  POCT Blood Glucose.: 122 mg/dL (18 Jun 2025 11:06)  POCT Blood Glucose.: 131 mg/dL (18 Jun 2025 09:18)      Drug Levels: [] N/A    CSF Analysis: [] N/A      Allergies    Keflex (Unknown)    Intolerances      MEDICATIONS:  Antibiotics:    Neuro:  oxyCODONE    IR 10 milliGRAM(s) Oral once      RADIOLOGY & ADDITIONAL TESTS:  < from: MR Sella alone w/wo IV Cont (12.12.23 @ 19:38) >  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    --- End of Report ---      < end of copied text >      ASSESSMENT:  33 y/o female, PMH DM2, fibromyalgia, asthma, psoriatic arthritis, presents with presents w/ benign neoplasm of pituitary gland, now s/p TSP for resection of pituitary tumor/cyst.   POD#0    PLAN:  Neuro   - Neuro/vital checks q1h   - CTH in am   - MR brain w/ w/o within 72 hours  - Pain control: PRN Tylenol/Oxycodone/Dilaudid  - DI watch; strict I/O's  - TSP precautions   - continue home cymbalta     Cards  - SBP <160   - PRN hydralazine | Labetalol     Pulm  - Face tent PRN periop   - NO NC    GI   - ADAT   - Bowel regimen: miralax, senna    Renal   - Jha - strict i/o's   - TOV in am   - IVF until adequate PO diet    Endo  - ISS  - endo labs ordered {TSH, T3,T4, HGH, IGF, etc}- will consult endo in am for further recs  - cortisol lvl 8 am     Heme  - SCD's   - No chemoppx periop     ID  - clindamycin while feng splints in place per ENT     Dispo: NSICU, PT/OT pending   D/w Dr. Dominguez HPI:  Pt is a pleasant 35 y/o female, PMH DM2, fibromyalgia, asthma, psoriatic arthritis, presents with c/o benign neoplasm of pituitary gland.  Per surgeon's note "She originally presented to [facility] in 12/2023 with headache, and she was found to have a sellar mass (2.2 x 1.0 x 1.7 cm). She was planned to undergo surgical resection, but she had follow up MRI which showed a decrease in the lesion, now 1.5 x 0.9 x 0.6 cm. Her intervention was deferred, and she now presents for follow up. She had a repeat MRI w/wo which showed stable size of lesion (1.5 x 0.9 x 0.7 cm) but with contrast hyperintensity within the R side of the lesion (previously L side hyperintensity). She admits to intermittent headaches, often with her menstrual cycle. She also admitted to an episode of flashing lights within her peripheral vision which has now resolved".  Pt explains concern mass may have grown, now scheduled for intervention.  Pt reports feeling generally well, denies recent fever/cough/cold, infection or abx use.  Currently pt denies numbness/tingling/weakness, difficulty ambulating / speaking, dizziness, visual changes.  Pt presents today for Endoscopic Transphenoidal Resection of Pituitary Mass on 6.18.2025 with Dr. Dominguez.    Hospital Course: POD#0 s/p endoscopic transphenoidal resection of pituitary tumor, pt brought to NSICU for strict I/O's and q1h post op monitoring.     Vital Signs Last 24 Hrs  T(C): 36.2 (18 Jun 2025 08:42), Max: 36.2 (18 Jun 2025 08:42)  T(F): 97.2 (18 Jun 2025 08:42), Max: 97.2 (18 Jun 2025 08:42)  HR: 86 (18 Jun 2025 08:42) (86 - 86)  BP: 149/83 (18 Jun 2025 08:42) (149/83 - 149/83)  BP(mean): --  RR: 16 (18 Jun 2025 08:42) (16 - 16)  SpO2: 100% (18 Jun 2025 08:42) (100% - 100%)    Parameters below as of 18 Jun 2025 08:42  Patient On (Oxygen Delivery Method): room air        I&O's Summary      PHYSICAL EXAM:  General: patient seen laying supine in bed in NAD  Neuro: AAOx3, FC, OE spontaneously, speech clear and fluent, CNII-XI grossly intact, face symmetric, no pronator drift, strength 5/5 b/l UE and LE, sensation grossly intact to light touch throughout  HEENT: PERRL, EOMI  Neck: supple  Cardiac: RRR, S1S2  Pulmonary: chest rise symmetric  Abdomen: soft, nontender, nondistended  Ext: perfusing well  Skin: warm, dry      LABS:      CAPILLARY BLOOD GLUCOSE      POCT Blood Glucose.: 147 mg/dL (18 Jun 2025 12:50)  POCT Blood Glucose.: 122 mg/dL (18 Jun 2025 11:06)  POCT Blood Glucose.: 131 mg/dL (18 Jun 2025 09:18)      Drug Levels: [] N/A    CSF Analysis: [] N/A      Allergies    Keflex (Unknown)    Intolerances      MEDICATIONS:  Antibiotics:    Neuro:  oxyCODONE    IR 10 milliGRAM(s) Oral once      RADIOLOGY & ADDITIONAL TESTS:  < from: MR Sella alone w/wo IV Cont (12.12.23 @ 19:38) >  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    --- End of Report ---      < end of copied text >

## 2025-06-18 NOTE — CONSULT NOTE ADULT - CRITICAL CARE ATTENDING COMMENT
Pt seen and examined. Agree with above assessment and plan.  My full attention was spent providing medically necessary critical care to the patient with details documented in my note above.   I spent 55 minutes of critical care time examining patient, reviewing vitals, labs, medications, imaging and discussing with the team goals of care to prevent life-threatening in this patient who is at high risk for deterioration or death due to:    This time does not include bedside procedures that are documented separately.    33 y/o female, PMH DM2, fibromyalgia, asthma, psoriatic arthritis, presents with presents w/ benign neoplasm of pituitary gland, now s/p TSP for resection of pituitary tumor/cyst.   POD#0    neurointact    - Neuro/vital checks q1h   - CTH in am   - MR brain w/ w/o within 72 hours  - Pain control: PRN Tylenol/Oxycodone/Dilaudid  - DI watch; strict I/O's  - TSP precautions   - continue home cymbalta Pt seen and examined. Agree with above assessment and plan.  My full attention was spent providing medically necessary critical care to the patient with details documented in my note above.   I spent 55 minutes of critical care time examining patient, reviewing vitals, labs, medications, imaging and discussing with the team goals of care to prevent life-threatening in this patient who is at high risk for deterioration or death due to:  pituitary mass  This time does not include bedside procedures that are documented separately.    33 y/o female, PMH DM2, fibromyalgia, asthma, psoriatic arthritis, presents with presents w/ benign neoplasm of pituitary gland, now s/p TSP for resection of pituitary tumor/cyst.   POD#0    neurointact    - Neuro/vital checks q1h   - CTH in am   - MR brain w/ w/o within 72 hours  - Pain control: PRN Tylenol/Oxycodone/Dilaudid  - DI watch; strict I/O's  - TSP precautions   - continue home cymbalta

## 2025-06-18 NOTE — PATIENT PROFILE ADULT - FUNCTIONAL SCREEN CURRENT LEVEL: SWALLOWING (IF SCORE 2 OR MORE FOR ANY ITEM, CONSULT REHAB SERVICES), MLM)
Individual psychotherapy Individual psychotherapy Individual psychotherapy Individual psychotherapy Individual psychotherapy Individual psychotherapy Individual psychotherapy Individual psychotherapy Individual psychotherapy Individual psychotherapy Individual psychotherapy 0 = swallows foods/liquids without difficulty Individual psychotherapy Individual psychotherapy Individual psychotherapy Individual psychotherapy Individual psychotherapy Individual psychotherapy Individual psychotherapy Individual psychotherapy Individual psychotherapy Individual psychotherapy

## 2025-06-19 LAB
ANION GAP SERPL CALC-SCNC: 11 MMOL/L — SIGNIFICANT CHANGE UP (ref 5–17)
ANION GAP SERPL CALC-SCNC: 14 MMOL/L — SIGNIFICANT CHANGE UP (ref 5–17)
ANION GAP SERPL CALC-SCNC: 15 MMOL/L — SIGNIFICANT CHANGE UP (ref 5–17)
ANION GAP SERPL CALC-SCNC: 16 MMOL/L — SIGNIFICANT CHANGE UP (ref 5–17)
APPEARANCE UR: CLEAR — SIGNIFICANT CHANGE UP
APPEARANCE UR: CLEAR — SIGNIFICANT CHANGE UP
BACTERIA # UR AUTO: NEGATIVE /HPF — SIGNIFICANT CHANGE UP
BILIRUB UR-MCNC: NEGATIVE — SIGNIFICANT CHANGE UP
BILIRUB UR-MCNC: NEGATIVE — SIGNIFICANT CHANGE UP
BUN SERPL-MCNC: 12.7 MG/DL — SIGNIFICANT CHANGE UP (ref 8–20)
BUN SERPL-MCNC: 6.8 MG/DL — LOW (ref 8–20)
BUN SERPL-MCNC: 7 MG/DL — LOW (ref 8–20)
BUN SERPL-MCNC: 8.8 MG/DL — SIGNIFICANT CHANGE UP (ref 8–20)
CALCIUM SERPL-MCNC: 7.7 MG/DL — LOW (ref 8.4–10.5)
CALCIUM SERPL-MCNC: 8 MG/DL — LOW (ref 8.4–10.5)
CALCIUM SERPL-MCNC: 8 MG/DL — LOW (ref 8.4–10.5)
CALCIUM SERPL-MCNC: 8.5 MG/DL — SIGNIFICANT CHANGE UP (ref 8.4–10.5)
CAST: 0 /LPF — SIGNIFICANT CHANGE UP (ref 0–4)
CHLORIDE SERPL-SCNC: 103 MMOL/L — SIGNIFICANT CHANGE UP (ref 96–108)
CHLORIDE SERPL-SCNC: 104 MMOL/L — SIGNIFICANT CHANGE UP (ref 96–108)
CHLORIDE SERPL-SCNC: 104 MMOL/L — SIGNIFICANT CHANGE UP (ref 96–108)
CHLORIDE SERPL-SCNC: 106 MMOL/L — SIGNIFICANT CHANGE UP (ref 96–108)
CO2 SERPL-SCNC: 18 MMOL/L — LOW (ref 22–29)
CO2 SERPL-SCNC: 18 MMOL/L — LOW (ref 22–29)
CO2 SERPL-SCNC: 22 MMOL/L — SIGNIFICANT CHANGE UP (ref 22–29)
CO2 SERPL-SCNC: 23 MMOL/L — SIGNIFICANT CHANGE UP (ref 22–29)
COLOR SPEC: YELLOW — SIGNIFICANT CHANGE UP
COLOR SPEC: YELLOW — SIGNIFICANT CHANGE UP
CORTIS AM PEAK SERPL-MCNC: 12.1 UG/DL — SIGNIFICANT CHANGE UP (ref 6–18.4)
CREAT SERPL-MCNC: 0.45 MG/DL — LOW (ref 0.5–1.3)
CREAT SERPL-MCNC: 0.46 MG/DL — LOW (ref 0.5–1.3)
CREAT SERPL-MCNC: 0.54 MG/DL — SIGNIFICANT CHANGE UP (ref 0.5–1.3)
CREAT SERPL-MCNC: 0.57 MG/DL — SIGNIFICANT CHANGE UP (ref 0.5–1.3)
DIFF PNL FLD: ABNORMAL
DIFF PNL FLD: NEGATIVE — SIGNIFICANT CHANGE UP
EGFR: 121 ML/MIN/1.73M2 — SIGNIFICANT CHANGE UP
EGFR: 121 ML/MIN/1.73M2 — SIGNIFICANT CHANGE UP
EGFR: 123 ML/MIN/1.73M2 — SIGNIFICANT CHANGE UP
EGFR: 123 ML/MIN/1.73M2 — SIGNIFICANT CHANGE UP
EGFR: 128 ML/MIN/1.73M2 — SIGNIFICANT CHANGE UP
EGFR: 128 ML/MIN/1.73M2 — SIGNIFICANT CHANGE UP
EGFR: 129 ML/MIN/1.73M2 — SIGNIFICANT CHANGE UP
EGFR: 129 ML/MIN/1.73M2 — SIGNIFICANT CHANGE UP
GLUCOSE BLDC GLUCOMTR-MCNC: 151 MG/DL — HIGH (ref 70–99)
GLUCOSE BLDC GLUCOMTR-MCNC: 186 MG/DL — HIGH (ref 70–99)
GLUCOSE BLDC GLUCOMTR-MCNC: 211 MG/DL — HIGH (ref 70–99)
GLUCOSE SERPL-MCNC: 138 MG/DL — HIGH (ref 70–99)
GLUCOSE SERPL-MCNC: 145 MG/DL — HIGH (ref 70–99)
GLUCOSE SERPL-MCNC: 183 MG/DL — HIGH (ref 70–99)
GLUCOSE SERPL-MCNC: 235 MG/DL — HIGH (ref 70–99)
GLUCOSE UR QL: NEGATIVE MG/DL — SIGNIFICANT CHANGE UP
GLUCOSE UR QL: NEGATIVE MG/DL — SIGNIFICANT CHANGE UP
HCG UR QL: NEGATIVE — SIGNIFICANT CHANGE UP
HCT VFR BLD CALC: 36.5 % — SIGNIFICANT CHANGE UP (ref 34.5–45)
HGB BLD-MCNC: 11.6 G/DL — SIGNIFICANT CHANGE UP (ref 11.5–15.5)
INSULIN-LIKE GROWTH FACTOR 1 INTERPRETATION: SIGNIFICANT CHANGE UP
INSULIN-LIKE GROWTH FACTOR 1: 96 NG/ML — SIGNIFICANT CHANGE UP (ref 81–278)
KETONES UR QL: NEGATIVE MG/DL — SIGNIFICANT CHANGE UP
KETONES UR QL: NEGATIVE MG/DL — SIGNIFICANT CHANGE UP
LEUKOCYTE ESTERASE UR-ACNC: NEGATIVE — SIGNIFICANT CHANGE UP
LEUKOCYTE ESTERASE UR-ACNC: NEGATIVE — SIGNIFICANT CHANGE UP
LH SERPL-ACNC: <0.3 IU/L — SIGNIFICANT CHANGE UP
MAGNESIUM SERPL-MCNC: 2.4 MG/DL — SIGNIFICANT CHANGE UP (ref 1.6–2.6)
MCHC RBC-ENTMCNC: 26.6 PG — LOW (ref 27–34)
MCHC RBC-ENTMCNC: 31.8 G/DL — LOW (ref 32–36)
MCV RBC AUTO: 83.7 FL — SIGNIFICANT CHANGE UP (ref 80–100)
NITRITE UR-MCNC: NEGATIVE — SIGNIFICANT CHANGE UP
NITRITE UR-MCNC: NEGATIVE — SIGNIFICANT CHANGE UP
NRBC # BLD AUTO: 0 K/UL — SIGNIFICANT CHANGE UP (ref 0–0)
NRBC # FLD: 0 K/UL — SIGNIFICANT CHANGE UP (ref 0–0)
NRBC BLD AUTO-RTO: 0 /100 WBCS — SIGNIFICANT CHANGE UP (ref 0–0)
OSMOLALITY SERPL: 302 MOSMOL/KG — HIGH (ref 275–300)
OSMOLALITY UR: 275 MOSM/KG — LOW (ref 300–1000)
PH UR: 6.5 — SIGNIFICANT CHANGE UP (ref 5–8)
PH UR: 6.5 — SIGNIFICANT CHANGE UP (ref 5–8)
PHOSPHATE SERPL-MCNC: 2.1 MG/DL — LOW (ref 2.4–4.7)
PLATELET # BLD AUTO: 353 K/UL — SIGNIFICANT CHANGE UP (ref 150–400)
PMV BLD: 9.5 FL — SIGNIFICANT CHANGE UP (ref 7–13)
POTASSIUM SERPL-MCNC: 4 MMOL/L — SIGNIFICANT CHANGE UP (ref 3.5–5.3)
POTASSIUM SERPL-MCNC: 4.1 MMOL/L — SIGNIFICANT CHANGE UP (ref 3.5–5.3)
POTASSIUM SERPL-MCNC: 4.2 MMOL/L — SIGNIFICANT CHANGE UP (ref 3.5–5.3)
POTASSIUM SERPL-MCNC: 4.2 MMOL/L — SIGNIFICANT CHANGE UP (ref 3.5–5.3)
POTASSIUM SERPL-SCNC: 4 MMOL/L — SIGNIFICANT CHANGE UP (ref 3.5–5.3)
POTASSIUM SERPL-SCNC: 4.1 MMOL/L — SIGNIFICANT CHANGE UP (ref 3.5–5.3)
POTASSIUM SERPL-SCNC: 4.2 MMOL/L — SIGNIFICANT CHANGE UP (ref 3.5–5.3)
POTASSIUM SERPL-SCNC: 4.2 MMOL/L — SIGNIFICANT CHANGE UP (ref 3.5–5.3)
PROLACTIN SERPL-MCNC: 29 NG/ML — HIGH (ref 3.4–24.1)
PROT UR-MCNC: NEGATIVE MG/DL — SIGNIFICANT CHANGE UP
PROT UR-MCNC: NEGATIVE MG/DL — SIGNIFICANT CHANGE UP
RBC # BLD: 4.36 M/UL — SIGNIFICANT CHANGE UP (ref 3.8–5.2)
RBC # FLD: 13.5 % — SIGNIFICANT CHANGE UP (ref 10.3–14.5)
RBC CASTS # UR COMP ASSIST: 5 /HPF — HIGH (ref 0–4)
SODIUM SERPL-SCNC: 137 MMOL/L — SIGNIFICANT CHANGE UP (ref 135–145)
SODIUM SERPL-SCNC: 137 MMOL/L — SIGNIFICANT CHANGE UP (ref 135–145)
SODIUM SERPL-SCNC: 138 MMOL/L — SIGNIFICANT CHANGE UP (ref 135–145)
SODIUM SERPL-SCNC: 142 MMOL/L — SIGNIFICANT CHANGE UP (ref 135–145)
SP GR SPEC: 1.01 — SIGNIFICANT CHANGE UP (ref 1–1.03)
SP GR SPEC: 1.01 — SIGNIFICANT CHANGE UP (ref 1–1.03)
SQUAMOUS # UR AUTO: 1 /HPF — SIGNIFICANT CHANGE UP (ref 0–5)
UROBILINOGEN FLD QL: 0.2 MG/DL — SIGNIFICANT CHANGE UP (ref 0.2–1)
UROBILINOGEN FLD QL: 0.2 MG/DL — SIGNIFICANT CHANGE UP (ref 0.2–1)
WBC # BLD: 10.17 K/UL — SIGNIFICANT CHANGE UP (ref 3.8–10.5)
WBC # FLD AUTO: 10.17 K/UL — SIGNIFICANT CHANGE UP (ref 3.8–10.5)
WBC UR QL: 1 /HPF — SIGNIFICANT CHANGE UP (ref 0–5)

## 2025-06-19 PROCEDURE — 99222 1ST HOSP IP/OBS MODERATE 55: CPT

## 2025-06-19 PROCEDURE — 70553 MRI BRAIN STEM W/O & W/DYE: CPT | Mod: 26

## 2025-06-19 PROCEDURE — 99232 SBSQ HOSP IP/OBS MODERATE 35: CPT

## 2025-06-19 PROCEDURE — 70450 CT HEAD/BRAIN W/O DYE: CPT | Mod: 26

## 2025-06-19 RX ORDER — SODIUM PHOSPHATE,DIBASIC DIHYD
30 POWDER (GRAM) MISCELLANEOUS ONCE
Refills: 0 | Status: DISCONTINUED | OUTPATIENT
Start: 2025-06-19 | End: 2025-06-19

## 2025-06-19 RX ORDER — ENOXAPARIN SODIUM 100 MG/ML
40 INJECTION SUBCUTANEOUS
Refills: 0 | Status: DISCONTINUED | OUTPATIENT
Start: 2025-06-19 | End: 2025-06-20

## 2025-06-19 RX ORDER — SODIUM CHLORIDE 0.65 %
1 AEROSOL, SPRAY (ML) NASAL
Refills: 0 | Status: DISCONTINUED | OUTPATIENT
Start: 2025-06-19 | End: 2025-06-20

## 2025-06-19 RX ORDER — SODIUM PHOSPHATE,DIBASIC DIHYD
30 POWDER (GRAM) MISCELLANEOUS ONCE
Refills: 0 | Status: COMPLETED | OUTPATIENT
Start: 2025-06-19 | End: 2025-06-19

## 2025-06-19 RX ORDER — CALCIUM GLUCONATE 20 MG/ML
2 INJECTION, SOLUTION INTRAVENOUS ONCE
Refills: 0 | Status: COMPLETED | OUTPATIENT
Start: 2025-06-19 | End: 2025-06-19

## 2025-06-19 RX ORDER — CLINDAMYCIN PHOSPHATE 150 MG/ML
600 VIAL (ML) INJECTION EVERY 8 HOURS
Refills: 0 | Status: DISCONTINUED | OUTPATIENT
Start: 2025-06-19 | End: 2025-06-20

## 2025-06-19 RX ORDER — LORATADINE 5 MG/5ML
10 SOLUTION ORAL ONCE
Refills: 0 | Status: COMPLETED | OUTPATIENT
Start: 2025-06-19 | End: 2025-06-19

## 2025-06-19 RX ADMIN — Medication 650 MILLIGRAM(S): at 19:29

## 2025-06-19 RX ADMIN — Medication 108 MILLIGRAM(S): at 13:04

## 2025-06-19 RX ADMIN — Medication 650 MILLIGRAM(S): at 14:00

## 2025-06-19 RX ADMIN — OXYCODONE HYDROCHLORIDE 5 MILLIGRAM(S): 30 TABLET ORAL at 15:27

## 2025-06-19 RX ADMIN — Medication 650 MILLIGRAM(S): at 20:00

## 2025-06-19 RX ADMIN — Medication 650 MILLIGRAM(S): at 00:08

## 2025-06-19 RX ADMIN — Medication 1 SPRAY(S): at 15:22

## 2025-06-19 RX ADMIN — Medication 1 APPLICATION(S): at 11:09

## 2025-06-19 RX ADMIN — ENOXAPARIN SODIUM 40 MILLIGRAM(S): 100 INJECTION SUBCUTANEOUS at 21:46

## 2025-06-19 RX ADMIN — Medication 1 SPRAY(S): at 11:07

## 2025-06-19 RX ADMIN — OXYCODONE HYDROCHLORIDE 10 MILLIGRAM(S): 30 TABLET ORAL at 23:41

## 2025-06-19 RX ADMIN — INSULIN LISPRO 4: 100 INJECTION, SOLUTION INTRAVENOUS; SUBCUTANEOUS at 11:08

## 2025-06-19 RX ADMIN — OXYCODONE HYDROCHLORIDE 10 MILLIGRAM(S): 30 TABLET ORAL at 20:00

## 2025-06-19 RX ADMIN — Medication 1 SPRAY(S): at 16:55

## 2025-06-19 RX ADMIN — Medication 650 MILLIGRAM(S): at 13:14

## 2025-06-19 RX ADMIN — Medication 85 MILLIMOLE(S): at 05:39

## 2025-06-19 RX ADMIN — CALCIUM GLUCONATE 200 GRAM(S): 20 INJECTION, SOLUTION INTRAVENOUS at 04:02

## 2025-06-19 RX ADMIN — POLYETHYLENE GLYCOL 3350 17 GRAM(S): 17 POWDER, FOR SOLUTION ORAL at 11:08

## 2025-06-19 RX ADMIN — Medication 1 SPRAY(S): at 19:30

## 2025-06-19 RX ADMIN — INSULIN LISPRO 2: 100 INJECTION, SOLUTION INTRAVENOUS; SUBCUTANEOUS at 07:47

## 2025-06-19 RX ADMIN — LORATADINE 10 MILLIGRAM(S): 5 SOLUTION ORAL at 08:47

## 2025-06-19 RX ADMIN — OXYCODONE HYDROCHLORIDE 5 MILLIGRAM(S): 30 TABLET ORAL at 16:19

## 2025-06-19 RX ADMIN — DULOXETINE 60 MILLIGRAM(S): 20 CAPSULE, DELAYED RELEASE ORAL at 11:07

## 2025-06-19 RX ADMIN — OXYCODONE HYDROCHLORIDE 10 MILLIGRAM(S): 30 TABLET ORAL at 19:29

## 2025-06-19 RX ADMIN — Medication 1 SPRAY(S): at 21:46

## 2025-06-19 RX ADMIN — Medication 1 SPRAY(S): at 13:04

## 2025-06-19 RX ADMIN — INSULIN LISPRO 2: 100 INJECTION, SOLUTION INTRAVENOUS; SUBCUTANEOUS at 16:55

## 2025-06-19 RX ADMIN — Medication 1 SPRAY(S): at 23:42

## 2025-06-19 RX ADMIN — MONTELUKAST SODIUM 10 MILLIGRAM(S): 10 TABLET ORAL at 11:08

## 2025-06-19 RX ADMIN — Medication 10 MILLIGRAM(S): at 21:46

## 2025-06-19 RX ADMIN — Medication 650 MILLIGRAM(S): at 01:08

## 2025-06-19 RX ADMIN — Medication 108 MILLIGRAM(S): at 21:45

## 2025-06-19 RX ADMIN — Medication 108 MILLIGRAM(S): at 05:04

## 2025-06-19 NOTE — PHYSICAL THERAPY INITIAL EVALUATION ADULT - PERTINENT HX OF CURRENT PROBLEM, REHAB EVAL
c/o benign neoplasm of pituitary gland.  Per surgeon's note "She originally presented to [facility] in 12/2023 with headache, and she was found to have a sellar mass (2.2 x 1.0 x 1.7 cm). She was planned to undergo surgical resection, but she had follow up MRI which showed a decrease in the lesion, now 1.5 x 0.9 x 0.6 cm. Her intervention was deferred, and she now presents for follow up. She had a repeat MRI w/wo which showed stable size of lesion (1.5 x 0.9 x 0.7 cm) but with contrast hyperintensity within the R side of the lesion (previously L side hyperintensity). She admits to intermittent headaches, often with her menstrual cycle. She also admitted to an episode of flashing lights within her peripheral vision which has now resolved".  Pt explains concern mass may have grown, now scheduled for intervention.  Pt reports feeling generally well, denies recent fever/cough/cold, infection or abx use.  Currently pt denies numbness/tingling/weakness, difficulty ambulating / speaking, dizziness, visual changes.  Scheduled for Endoscopic Transphenoidal Resection of Pituitary Mass

## 2025-06-19 NOTE — DISCHARGE NOTE PROVIDER - DETAILS OF MALNUTRITION DIAGNOSIS/DIAGNOSES
This patient has been assessed with a concern for Malnutrition and was treated during this hospitalization for the following Nutrition diagnosis/diagnoses:     -  06/20/2025: Morbid obesity (BMI > 40)

## 2025-06-19 NOTE — DISCHARGE NOTE PROVIDER - NSDCMRMEDTOKEN_GEN_ALL_CORE_FT
Albuterol (Eqv-ProAir HFA) 90 mcg/inh inhalation aerosol: 2 inhaled once a day as needed  B12 sublingal, vit D 3:   Cymbalta 60 mg oral delayed release capsule: 1 cap(s) orally once a day  MetFORMIN (Eqv-Fortamet) 500 mg oral tablet, extended release: 1 tab(s) orally 2 times a day  mupirocin 2% topical ointment: 1 application in each nostril 2 times a day TO START 6.13.2025: Apply pea sized amount to clean q tip to inside nostril, repeat other side with new clean q tip, gently squeeze nostrils together to disperse cream  Otezla 30 mg oral tablet: 1 tab(s) orally 2 times a day  semaglutide 1 mg/0.5 mL (1 mg dose) subcutaneous solution: 1 milligram(s) subcutaneously once a week  Singulair 10 mg oral tablet: 1 tab(s) orally once a day   Albuterol (Eqv-ProAir HFA) 90 mcg/inh inhalation aerosol: 2 inhaled once a day as needed  B12 sublingal, vit D 3:   Cymbalta 60 mg oral delayed release capsule: 1 cap(s) orally once a day  MetFORMIN (Eqv-Fortamet) 500 mg oral tablet, extended release: 1 tab(s) orally 2 times a day  Otezla 30 mg oral tablet: 1 tab(s) orally 2 times a day  semaglutide 1 mg/0.5 mL (1 mg dose) subcutaneous solution: 1 milligram(s) subcutaneously once a week   Albuterol (Eqv-ProAir HFA) 90 mcg/inh inhalation aerosol: 2 inhaled once a day as needed  B12 sublingal, vit D 3:   Cymbalta 60 mg oral delayed release capsule: 1 cap(s) orally once a day  MetFORMIN (Eqv-Fortamet) 500 mg oral tablet, extended release: 1 tab(s) orally 2 times a day  montelukast 10 mg oral tablet: 1 tab(s) orally once a day  Otezla 30 mg oral tablet: 1 tab(s) orally 2 times a day  semaglutide 1 mg/0.5 mL (1 mg dose) subcutaneous solution: 1 milligram(s) subcutaneously once a week  sodium chloride 0.65% nasal spray: 1 spray(s) nasal every 1 to 2 hours 1 spray to bilateral nostrils every 2 hours

## 2025-06-19 NOTE — CONSULT NOTE ADULT - ASSESSMENT
34 y.o. Female with obesity and PMHx of T2D, Fibromyalgia, Asthma, Psoriatic arthritis underwent pituitary resection for Rathke's Cleft Cyst 6/18/25. Dx with pituitary mass 2.2 cm in 12/2023 which decreased in size over time but she has been c/o headaches and episode of visual changes (flushing lights). Pre op hormonal labs were done by patient endocrinology NP at [facility]. Upon my evaluation patient feels OK. Denies headaches or excessive nasal discharge.       # Hx of Rathke's Cleft Cyst  S/p Endonasal Transphenoidal resection 6/18/25  Post op labs showing normal Cortisol and Na  Low gonadotropins. Patient has Hx of irregular periods.  No signs/symptoms of DI  F/u hormonal work up including AM Cortisol, ACTH, TSH, FT4, FT3, LH, FSH, Estradiol  Neuro checks as per NICU protocol  Strict I&O  Adequate pain management.   Endocrinology will follow.      34 y.o. Female with obesity and PMHx of T2D, Fibromyalgia, Asthma, Psoriatic arthritis underwent pituitary resection for Rathke's Cleft Cyst 6/18/25. Dx with pituitary mass 2.2 cm in 12/2023 which decreased in size over time but she has been c/o headaches and episode of visual changes (flushing lights). Pre op hormonal labs were done by patient endocrinology NP at [facility]. Upon my evaluation patient feels OK. Denies headaches or excessive nasal discharge.       # Hx of Rathke's Cleft Cyst  S/p Endonasal Transphenoidal resection 6/18/25  Post op labs showing normal Cortisol and Na  Low gonadotropins. Patient has Hx of irregular periods.  No signs/symptoms of DI  F/u hormonal work up including AM Cortisol, ACTH, TSH, FT4, FT3, LH, FSH, Estradiol  Neuro checks as per NICU protocol  Strict I&O  Adequate pain management.       # T2D with post op hyperglycemia  Home regimen consist of Metformin 1000 mg BID and Ozempic - on hold  Currently on moderate ISS  Continue current regimen  Monitor POC glucose qac and qhs with goal 120 - 180 mg/dL  Might require small dose of Lantus at HS.

## 2025-06-19 NOTE — DISCHARGE NOTE PROVIDER - HOSPITAL COURSE
Patient is a pleasant 33 y/o female, PMH DM2, fibromyalgia, asthma, psoriatic arthritis, presents to [facility] with c/o benign neoplasm of pituitary gland.  Per surgeon's note "She originally presented to [facility] in 12/2023 with headache, and she was found to have a sellar mass (2.2 x 1.0 x 1.7 cm). She was planned to undergo surgical resection, but she had a follow up MRI which showed a decrease in the lesion, now 1.5 x 0.9 x 0.6 cm. Her intervention was deferred, and she now presents for follow up after a repeat MRI w/wo which showed stable size of lesion (1.5 x 0.9 x 0.7 cm) but with contrast hyperintensity within the R side of the lesion (previously L side hyperintensity). On admission patient admitted to intermittent headaches, often associated with her menstrual cycle. Patient also admitted to an episode of flashing lights within her peripheral vision which has now resolved". Patient presents for admission for her scheduled Endoscopic Transphenoidal Resection of Pituitary Mass on 6.18.2025 with neurosurgeon Dr. Dominguez and ENT Dr. Weinberg.

## 2025-06-19 NOTE — DISCHARGE NOTE PROVIDER - NSDCCPCAREPLAN_GEN_ALL_CORE_FT
PRINCIPAL DISCHARGE DIAGNOSIS  Diagnosis: Benign neoplasm of pituitary gland  Assessment and Plan of Treatment: You were admitted for your elective endoscopic transphenoidal pituitary mass resection on 6/18/25 with your neurosurgeon Dr. Dominguez and ENT Dr. Weinberg.  You are POD#1, post-operatively you were admitted to the Neuro ICU overnight. You also completed MRI imaging and endocrinology was consulted per protocol. Please continue to take all of your medications as directed, please follow all dietary recommendations, please maintain adequate hydration and nutrition, and please follow up with all of your healthcare providers as directed.        PRINCIPAL DISCHARGE DIAGNOSIS  Diagnosis: Benign neoplasm of pituitary gland  Assessment and Plan of Treatment: You were admitted for your elective endoscopic transphenoidal pituitary mass resection on 6/18/25 with your neurosurgeon Dr. Dominguez and ENT Dr. Weinberg.  You are POD#1, post-operatively you were admitted to the Neuro ICU overnight. You also completed MRI imaging and endocrinology was consulted per protocol. Please continue to take all of your medications as directed, please follow all dietary recommendations, please maintain adequate hydration and nutrition, and please follow up with all of your healthcare providers as directed.   Please continue to follow the TSP precautions that were reviewed with you included but not limited to: No blowing your nose, no straws, no lozenges or sucking candies, no nasal oxygen, no oxygen masks, no picking your nose, no bending forward, no incentive spirometer, etc.

## 2025-06-19 NOTE — DISCHARGE NOTE PROVIDER - NSDCFUSCHEDAPPT_GEN_ALL_CORE_FT
[facility]  [address]  Scheduled Appointment: 08/18/2025     Da Weinberg  [facility]  [address]  Scheduled Appointment: 07/01/2025    [facility]  [address]  Scheduled Appointment: 08/18/2025

## 2025-06-19 NOTE — CHART NOTE - NSCHARTNOTEFT_GEN_A_CORE
HPI:  Patient is a 35 y/o female, PMH DM2, fibromyalgia, asthma, psoriatic arthritis, presents with c/o benign neoplasm of pituitary gland.  Per surgeon's note "She originally presented to [facility] in 12/2023 with headache, and she was found to have a sellar mass (2.2 x 1.0 x 1.7 cm). She was planned to undergo surgical resection, but she had follow up MRI which showed a decrease in the lesion, now 1.5 x 0.9 x 0.6 cm. Her intervention was deferred, and she now presents for follow up. She had a repeat MRI w/wo which showed stable size of lesion (1.5 x 0.9 x 0.7 cm) but with contrast hyperintensity within the R side of the lesion (previously L side hyperintensity). She admits to intermittent headaches, often with her menstrual cycle. She also admitted to an episode of flashing lights within her peripheral vision which has now resolved".  Pt explains concern mass may have grown, now scheduled for intervention.  Pt reports feeling generally well, denies recent fever/cough/cold, infection or abx use.  Currently pt denies numbness/tingling/weakness, difficulty ambulating / speaking, dizziness, visual changes.  Pt presents today for Endoscopic Transphenoidal Resection of Pituitary Mass on 6/18/2025 with Dr. Dominguez.    PROCEDURE: 6/18 Endoscoptic transphenoidal approach for Rathke's cleft cyst resection  POD# 1    Vital Signs Last 24 Hrs  T(C): 36.8 (06-19-25 @ 12:07), Max: 36.9 (06-18-25 @ 20:00)  T(F): 98.3 (06-19-25 @ 12:07), Max: 98.4 (06-18-25 @ 20:00)  HR: 86 (06-19-25 @ 15:00) (81 - 103)  BP: 131/73 (06-19-25 @ 15:00) (111/52 - 154/76)  BP(mean): 88 (06-19-25 @ 15:00) (66 - 100)  RR: 19 (06-19-25 @ 15:00) (15 - 24)  SpO2: 100% (06-19-25 @ 15:00) (96% - 100%)    PHYSICAL EXAM:  GENERAL: NAD  HEAD: Atraumatic, normocephalic  ENT: b/l nasal splints  NANCY COMA SCORE: E- V- M- = 15  MENTAL STATUS: AAOx3; Awake; Opens eyes spontaneously; Appropriately conversant without aphasia; following simple commands  CRANIAL NERVES: PERRL. EOMI without nystagmus. Face symmetric w/ normal eye closure and smile, tongue midline. Hearing grossly intact. Speech clear. Head turning and shoulder shrug intact.   MOTOR: strength 5/5 b/l upper and lower extremities  EXTREMITIES:  no clubbing, cyanosis, or edema  SKIN: Warm, dry    LABS:                        11.6   10.17 )-----------( 353      ( 19 Jun 2025 02:40 )             36.5    06-19    142  |  104  |  8.8  ----------------------------<  138[H]  4.0   |  23.0  |  0.54    Ca    8.5      19 Jun 2025 13:51  Phos  2.1     06-19  Mg     2.4     06-19      IMAGING:     MR Sella alone w/wo IV Cont (06.19.25 @ 10:48)  IMPRESSION:  1.  Postsurgical changes with excellent decompression of the optic chiasm.    CT Head No Cont (06.19.25 @ 06:19)  IMPRESSION:    Status post transsphenoidal approach resection of a sellar   lesion. Mild chronic microvascular changes without evidence of an acute   transcortical infarction or hemorrhage.    MR Sella alone w/wo IV Cont (12.12.23 @ 19:38)  IMPRESSION:   Large Rathke's cleft cyst versus cystic macroadenoma.        MEDICATIONS:  Antibiotics:  clindamycin IVPB 600 milliGRAM(s) IV Intermittent every 8 hours    Neuro:  acetaminophen     Tablet .. 650 milliGRAM(s) Oral every 6 hours PRN Temp greater or equal to 38C (100.4F), Mild Pain (1 - 3)  DULoxetine 60 milliGRAM(s) Oral daily  HYDROmorphone  Injectable 0.5 milliGRAM(s) IV Push every 6 hours PRN Severe Pain (7 - 10)  melatonin 10 milliGRAM(s) Oral at bedtime  oxyCODONE    IR 5 milliGRAM(s) Oral every 4 hours PRN Moderate Pain (4 - 6)  oxyCODONE    IR 10 milliGRAM(s) Oral every 4 hours PRN Severe Pain (7 - 10)    Cardiac:  hydrALAZINE Injectable 10 milliGRAM(s) IV Push every 2 hours PRN SBP > 160mm Hg  labetalol Injectable 10 milliGRAM(s) IV Push every 2 hours PRN Systolic blood pressure > 160    Pulm:  albuterol    90 MICROgram(s) HFA Inhaler 2 Puff(s) Inhalation every 6 hours PRN Shortness of Breath and/or Wheezing  montelukast 10 milliGRAM(s) Oral daily    GI/:  polyethylene glycol 3350 17 Gram(s) Oral daily  senna 2 Tablet(s) Oral at bedtime    Other:   chlorhexidine 2% Cloths 1 Application(s) Topical daily  dextrose 5%. 1000 milliLiter(s) IV Continuous <Continuous>  dextrose 5%. 1000 milliLiter(s) IV Continuous <Continuous>  dextrose 50% Injectable 25 Gram(s) IV Push once  dextrose 50% Injectable 12.5 Gram(s) IV Push once  dextrose 50% Injectable 25 Gram(s) IV Push once  dextrose Oral Gel 15 Gram(s) Oral once PRN Blood Glucose LESS THAN 70 milliGRAM(s)/deciliter  glucagon  Injectable 1 milliGRAM(s) IntraMuscular once  insulin lispro (ADMELOG) corrective regimen sliding scale   SubCutaneous three times a day before meals  sodium chloride 0.65% Nasal 1 Spray(s) Both Nostrils every 2 hours    -------------------------------------------------------------------------------------------------------------  ASSESSMENT/PLAN:  Patient is a 34-year-old female with PMH PMH DM2, fibromyalgia, asthma, psoriatic arthritis s/p TSP for Rathke's Cleft cyst removal on 6/18 with Dr. Dominguez, now POD 1    PLAN  Plan  - Q1 neuro checks  - CTH in AM  - MR Reed w/wo post op  - Pain control PRN; avoid oversedation  - Normotensive  - Regular diet  - Resume home meds when tolerating diet  - Strict ins/outs, DI watch  - BMP q 6  - Clindamycin until nasal splints removed by ENT  - CSF leak precautions: no straws, no blowing nose, avoid coughing/sneezing, no nasal cannula/nasal swabs/COVID swabs. Avoid leaning forward. Avoid straining. No incentive spirometry   - Record BM's. Miralax/Senna  - Full endocrine hormone panel in AM; Serum cortisol, prolactin, ACTH, insulin growth factor 1, GH, FSH, LH, T3 uptake, T4 serum, TSH, Testosterone  - Endocrine consult in AM  - PT/OT  - Nasal rinse to begin tomorrow  - ENT following  - b/l SCDs; hold AC/AP/chemical DVT prophylaxis at this time  - Medical management/supportive care per NSICU  - D/w Dr. Dominguez      Neuro:   - Q4h neuro checks  - MR reed +/- completed, pending read  - CSF leak precautions: no straws, no blowing nose, avoid coughing/sneezing, no nasal cannula/nasal swabs/COVID swabs. Avoid leaning forward. Avoid straining. No incentive spirometry  - Ocean spray per ENT q2h  - Pain control PRN: Tylenol, Oxy 5/10  - PT/OT      Respiratory:   - Room Air    CV:  - SBP goal   -     Renal:   - IVL vs NS @  - Voiding vs aaron vs HD schedule  - Replete electrolytes as needed    GI:    - NPO/ Regular Diet / Dysphagia  - Bowel Regimen: Colace, senna  - LBM:  - GI PPX:    Endocrine:   - ISS  - Endocrinology following, appreciate recs  - DI watch    Heme/Onc:               - DVT ppx: SCDs, SQL tonight    ID:   - Afebrile vs tMax _____      PT/OT:   Dispo: __________, pt signed out to _____________  Case discussed with ________________ HPI:  Patient is a 33 y/o female, PMH DM2, fibromyalgia, asthma, psoriatic arthritis, presents with c/o benign neoplasm of pituitary gland.  Per surgeon's note "She originally presented to [facility] in 12/2023 with headache, and she was found to have a sellar mass (2.2 x 1.0 x 1.7 cm). She was planned to undergo surgical resection, but she had follow up MRI which showed a decrease in the lesion, now 1.5 x 0.9 x 0.6 cm. Her intervention was deferred, and she now presents for follow up. She had a repeat MRI w/wo which showed stable size of lesion (1.5 x 0.9 x 0.7 cm) but with contrast hyperintensity within the R side of the lesion (previously L side hyperintensity). She admits to intermittent headaches, often with her menstrual cycle. She also admitted to an episode of flashing lights within her peripheral vision which has now resolved".  Pt explains concern mass may have grown, now scheduled for intervention.  Pt reports feeling generally well, denies recent fever/cough/cold, infection or abx use.  Currently pt denies numbness/tingling/weakness, difficulty ambulating / speaking, dizziness, visual changes.  Pt presents today for Endoscopic Transphenoidal Resection of Pituitary Mass on 6/18/2025 with Dr. Dominguez.    PROCEDURE: 6/18: Endoscopic transphenoidal approach for Rathke's cleft cyst resection  POD# 1    Vital Signs Last 24 Hrs  T(C): 36.8 (06-19-25 @ 12:07), Max: 36.9 (06-18-25 @ 20:00)  T(F): 98.3 (06-19-25 @ 12:07), Max: 98.4 (06-18-25 @ 20:00)  HR: 86 (06-19-25 @ 15:00) (81 - 103)  BP: 131/73 (06-19-25 @ 15:00) (111/52 - 154/76)  BP(mean): 88 (06-19-25 @ 15:00) (66 - 100)  RR: 19 (06-19-25 @ 15:00) (15 - 24)  SpO2: 100% (06-19-25 @ 15:00) (96% - 100%)    PHYSICAL EXAM:  GENERAL: NAD  HEAD: Atraumatic, normocephalic  ENT: b/l nasal splints  NANCY COMA SCORE: E- V- M- = 15  MENTAL STATUS: AAOx3; Awake; Opens eyes spontaneously; Appropriately conversant without aphasia; following simple commands  CRANIAL NERVES: PERRL. EOMI without nystagmus. Face symmetric w/ normal eye closure and smile, tongue midline. Hearing grossly intact. Speech clear. Head turning and shoulder shrug intact.   MOTOR: strength 5/5 b/l upper and lower extremities  EXTREMITIES:  no clubbing, cyanosis, or edema  SKIN: Warm, dry    LABS:                        11.6   10.17 )-----------( 353      ( 19 Jun 2025 02:40 )             36.5    06-19    142  |  104  |  8.8  ----------------------------<  138[H]  4.0   |  23.0  |  0.54    Ca    8.5      19 Jun 2025 13:51  Phos  2.1     06-19  Mg     2.4     06-19      IMAGING:     MR Sella alone w/wo IV Cont (06.19.25 @ 10:48)  IMPRESSION:  1.  Postsurgical changes with excellent decompression of the optic chiasm.    CT Head No Cont (06.19.25 @ 06:19)  IMPRESSION:    Status post transsphenoidal approach resection of a sellar   lesion. Mild chronic microvascular changes without evidence of an acute   transcortical infarction or hemorrhage.    MR Sella alone w/wo IV Cont (12.12.23 @ 19:38)  IMPRESSION:   Large Rathke's cleft cyst versus cystic macroadenoma.        MEDICATIONS:  Antibiotics:  clindamycin IVPB 600 milliGRAM(s) IV Intermittent every 8 hours    Neuro:  acetaminophen     Tablet .. 650 milliGRAM(s) Oral every 6 hours PRN Temp greater or equal to 38C (100.4F), Mild Pain (1 - 3)  DULoxetine 60 milliGRAM(s) Oral daily  HYDROmorphone  Injectable 0.5 milliGRAM(s) IV Push every 6 hours PRN Severe Pain (7 - 10)  melatonin 10 milliGRAM(s) Oral at bedtime  oxyCODONE    IR 5 milliGRAM(s) Oral every 4 hours PRN Moderate Pain (4 - 6)  oxyCODONE    IR 10 milliGRAM(s) Oral every 4 hours PRN Severe Pain (7 - 10)    Cardiac:  hydrALAZINE Injectable 10 milliGRAM(s) IV Push every 2 hours PRN SBP > 160mm Hg  labetalol Injectable 10 milliGRAM(s) IV Push every 2 hours PRN Systolic blood pressure > 160    Pulm:  albuterol    90 MICROgram(s) HFA Inhaler 2 Puff(s) Inhalation every 6 hours PRN Shortness of Breath and/or Wheezing  montelukast 10 milliGRAM(s) Oral daily    GI/:  polyethylene glycol 3350 17 Gram(s) Oral daily  senna 2 Tablet(s) Oral at bedtime    Other:   chlorhexidine 2% Cloths 1 Application(s) Topical daily  dextrose 5%. 1000 milliLiter(s) IV Continuous <Continuous>  dextrose 5%. 1000 milliLiter(s) IV Continuous <Continuous>  dextrose 50% Injectable 25 Gram(s) IV Push once  dextrose 50% Injectable 12.5 Gram(s) IV Push once  dextrose 50% Injectable 25 Gram(s) IV Push once  dextrose Oral Gel 15 Gram(s) Oral once PRN Blood Glucose LESS THAN 70 milliGRAM(s)/deciliter  glucagon  Injectable 1 milliGRAM(s) IntraMuscular once  insulin lispro (ADMELOG) corrective regimen sliding scale   SubCutaneous three times a day before meals  sodium chloride 0.65% Nasal 1 Spray(s) Both Nostrils every 2 hours    -------------------------------------------------------------------------------------------------------------  ASSESSMENT/PLAN:  Patient is a 34-year-old female with PMH PMH DM2, fibromyalgia, asthma, psoriatic arthritis s/p TSP for Rathke's Cleft cyst removal on 6/18 with Dr. Dominguez, now POD 1    Neuro:   - Q4h neuro checks  - MR sella +/- completed, pending read  - CSF leak precautions: no straws, no blowing nose, avoid coughing/sneezing, no nasal cannula/nasal swabs/COVID swabs. Avoid leaning forward. Avoid straining. No incentive spirometry  - Ocean spray per ENT q2h  - Pain control PRN: Tylenol, Oxy 5/10  - PT/OT: home, no needs    Respiratory:   - Room Air    CV:  - SBP goal   - PRN: Labetalol/Hdralazine    Renal:   - BMP q8  - Drink to thirst  - Replete electrolytes as needed    GI:    - Regular diet  - Bowel Regimen: senna, miralax  - LBM: 6/18    Endocrine:   - ISS  - Endocrinology following, appreciate recs  - DI watch    Heme/Onc:               - DVT ppx: SCDs, SQL tonight    ID:   - Afebrile  - Clindamycin until nasal splints removed by ENT    PT/OT: home, no skilled needs    Dispo: Neurosurgery telemetry    Case discussed with Dr. Wiley and Dr. Dominguez

## 2025-06-19 NOTE — PHYSICAL THERAPY INITIAL EVALUATION ADULT - ADDITIONAL COMMENTS
pt reports living in private apartment within home, alone however sister & brother in law live upstairs. 3 ALICE with handrail and 10 stairs inside with handrail. Independent prior to admission. Owns no DME

## 2025-06-19 NOTE — CONSULT NOTE ADULT - SUBJECTIVE AND OBJECTIVE BOX
HPI:  34 y.o. Female with obesity and PMHx of T2D, Fibromyalgia, Asthma, Psoriatic arthritis underwent pituitary resection for Rathke's Cleft Cyst 6/18/25. Dx with pituitary mass 2.2 cm in 12/2023 which decreased in size over time but she has been c/o headaches and episode of visual changes (flushing lights). Pre op hormonal labs were done by endocrinology NP at [facility]. Upon my evaluation patient feels OK. Denies headaches or excessive nasal discharge.       REVIEW OF SYSTEMS:    CONSTITUTIONAL: No fever, weight loss, or fatigue  EYES: No eye pain, visual disturbances, or discharge  ENMT:  No difficulty hearing, tinnitus, vertigo; No sinus or throat pain  NECK: No pain or stiffness  RESPIRATORY: No cough, wheezing, chills or hemoptysis; No shortness of breath  CARDIOVASCULAR: No chest pain, palpitations, dizziness, or leg swelling  GASTROINTESTINAL: No abdominal or epigastric pain. No nausea, vomiting, or hematemesis; No diarrhea or constipation. No melena or hematochezia.  NEUROLOGICAL: No headaches, memory loss, loss of strength, numbness, or tremors  SKIN: No itching, burning, rashes, or lesions   MUSCULOSKELETAL: No joint pain or swelling; No muscle, back, or extremity pain  PSYCHIATRIC: No depression, anxiety, mood swings, or difficulty sleeping      Allergies  Keflex (Hives (Mild))      MEDICATIONS  (STANDING):  chlorhexidine 2% Cloths 1 Application(s) Topical daily  clindamycin IVPB 600 milliGRAM(s) IV Intermittent every 8 hours  dextrose 5%. 1000 milliLiter(s) (100 mL/Hr) IV Continuous <Continuous>  dextrose 5%. 1000 milliLiter(s) (50 mL/Hr) IV Continuous <Continuous>  dextrose 50% Injectable 25 Gram(s) IV Push once  dextrose 50% Injectable 12.5 Gram(s) IV Push once  dextrose 50% Injectable 25 Gram(s) IV Push once  DULoxetine 60 milliGRAM(s) Oral daily  glucagon  Injectable 1 milliGRAM(s) IntraMuscular once  insulin lispro (ADMELOG) corrective regimen sliding scale   SubCutaneous three times a day before meals  melatonin 10 milliGRAM(s) Oral at bedtime  montelukast 10 milliGRAM(s) Oral daily  polyethylene glycol 3350 17 Gram(s) Oral daily  senna 2 Tablet(s) Oral at bedtime  sodium chloride 0.65% Nasal 1 Spray(s) Both Nostrils every 2 hours    MEDICATIONS  (PRN):  acetaminophen     Tablet .. 650 milliGRAM(s) Oral every 6 hours PRN Temp greater or equal to 38C (100.4F), Mild Pain (1 - 3)  albuterol    90 MICROgram(s) HFA Inhaler 2 Puff(s) Inhalation every 6 hours PRN Shortness of Breath and/or Wheezing  dextrose Oral Gel 15 Gram(s) Oral once PRN Blood Glucose LESS THAN 70 milliGRAM(s)/deciliter  hydrALAZINE Injectable 10 milliGRAM(s) IV Push every 2 hours PRN SBP > 160mm Hg  HYDROmorphone  Injectable 0.5 milliGRAM(s) IV Push every 6 hours PRN Severe Pain (7 - 10)  labetalol Injectable 10 milliGRAM(s) IV Push every 2 hours PRN Systolic blood pressure > 160  oxyCODONE    IR 5 milliGRAM(s) Oral every 4 hours PRN Moderate Pain (4 - 6)  oxyCODONE    IR 10 milliGRAM(s) Oral every 4 hours PRN Severe Pain (7 - 10)      Vital Signs Last 24 Hrs  T(C): 36.8 (19 Jun 2025 12:07), Max: 36.9 (18 Jun 2025 20:00)  T(F): 98.3 (19 Jun 2025 12:07), Max: 98.4 (18 Jun 2025 20:00)  HR: 88 (19 Jun 2025 13:00) (81 - 103)  BP: 154/76 (19 Jun 2025 13:00) (111/52 - 154/76)  BP(mean): 100 (19 Jun 2025 13:00) (66 - 100)  RR: 18 (19 Jun 2025 13:00) (14 - 24)  SpO2: 100% (19 Jun 2025 13:00) (96% - 100%)    Parameters below as of 19 Jun 2025 12:00  Patient On (Oxygen Delivery Method): room air      Weight (kg): 122.2 (06-18-25 @ 12:40)    Physical Exam:    Constitutional: NAD,  HEENT: EOMI, no exophalmos, Minimal nasal discharge.   Neck: trachea midline, no thyroid enlargement  Respiratory: CTAB, normal respirations  Cardiovascular: S1 and S2, RRR  Gastrointestinal: BS+, soft, ntnd  Extremities: No peripheral edema  Neurological: AOx3, no focal deficits  Psychiatric: Normal mood and normal affect  Skin: warm and dry    LABS  06-19    138  |  106  |  6.8[L]  ----------------------------<  183[H]  4.2   |  18.0[L]  |  0.46[L]    Ca    8.0[L]      19 Jun 2025 07:52  Phos  2.1     06-19  Mg     2.4     06-19                            11.6   10.17 )-----------( 353      ( 19 Jun 2025 02:40 )             36.5       Thyroid Stimulating Hormone, Serum: 3.21 uIU/mL (06-18-25 @ 13:34)  T4, Serum: 11.9 ug/dL (06-18-25 @ 13:34)  A1C with Estimated Average Glucose Result: 5.9 % (05-29-25 @ 12:45)      CAPILLARY BLOOD GLUCOSE    POCT Blood Glucose.: 211 mg/dL (19 Jun 2025 11:05)  POCT Blood Glucose.: 186 mg/dL (19 Jun 2025 07:44)  POCT Blood Glucose.: 254 mg/dL (18 Jun 2025 21:22)  POCT Blood Glucose.: 163 mg/dL (18 Jun 2025 15:05)

## 2025-06-19 NOTE — PHYSICAL THERAPY INITIAL EVALUATION ADULT - GENERAL OBSERVATIONS, REHAB EVAL
Pt received in bed + IV + tele//BP monitoring + aaron, breathing on RA in NAD, in 3-4/10 headache pain, agreeable to PT evaluation

## 2025-06-20 ENCOUNTER — TRANSCRIPTION ENCOUNTER (OUTPATIENT)
Age: 35
End: 2025-06-20

## 2025-06-20 VITALS
DIASTOLIC BLOOD PRESSURE: 79 MMHG | HEART RATE: 94 BPM | SYSTOLIC BLOOD PRESSURE: 144 MMHG | OXYGEN SATURATION: 97 % | RESPIRATION RATE: 24 BRPM

## 2025-06-20 LAB
ANION GAP SERPL CALC-SCNC: 12 MMOL/L — SIGNIFICANT CHANGE UP (ref 5–17)
ANION GAP SERPL CALC-SCNC: 17 MMOL/L — SIGNIFICANT CHANGE UP (ref 5–17)
BUN SERPL-MCNC: 11.3 MG/DL — SIGNIFICANT CHANGE UP (ref 8–20)
BUN SERPL-MCNC: 12.8 MG/DL — SIGNIFICANT CHANGE UP (ref 8–20)
CALCIUM SERPL-MCNC: 7.9 MG/DL — LOW (ref 8.4–10.5)
CALCIUM SERPL-MCNC: 8.3 MG/DL — LOW (ref 8.4–10.5)
CHLORIDE SERPL-SCNC: 104 MMOL/L — SIGNIFICANT CHANGE UP (ref 96–108)
CHLORIDE SERPL-SCNC: 97 MMOL/L — SIGNIFICANT CHANGE UP (ref 96–108)
CO2 SERPL-SCNC: 22 MMOL/L — SIGNIFICANT CHANGE UP (ref 22–29)
CO2 SERPL-SCNC: 22 MMOL/L — SIGNIFICANT CHANGE UP (ref 22–29)
CORTIS AM PEAK SERPL-MCNC: 20.1 UG/DL — HIGH (ref 6–18.4)
CREAT SERPL-MCNC: 0.5 MG/DL — SIGNIFICANT CHANGE UP (ref 0.5–1.3)
CREAT SERPL-MCNC: 0.57 MG/DL — SIGNIFICANT CHANGE UP (ref 0.5–1.3)
EGFR: 121 ML/MIN/1.73M2 — SIGNIFICANT CHANGE UP
EGFR: 121 ML/MIN/1.73M2 — SIGNIFICANT CHANGE UP
EGFR: 125 ML/MIN/1.73M2 — SIGNIFICANT CHANGE UP
EGFR: 125 ML/MIN/1.73M2 — SIGNIFICANT CHANGE UP
GLUCOSE BLDC GLUCOMTR-MCNC: 127 MG/DL — HIGH (ref 70–99)
GLUCOSE BLDC GLUCOMTR-MCNC: 129 MG/DL — HIGH (ref 70–99)
GLUCOSE SERPL-MCNC: 119 MG/DL — HIGH (ref 70–99)
GLUCOSE SERPL-MCNC: 196 MG/DL — HIGH (ref 70–99)
HCT VFR BLD CALC: 36.3 % — SIGNIFICANT CHANGE UP (ref 34.5–45)
HGB BLD-MCNC: 11.5 G/DL — SIGNIFICANT CHANGE UP (ref 11.5–15.5)
MAGNESIUM SERPL-MCNC: 2.1 MG/DL — SIGNIFICANT CHANGE UP (ref 1.6–2.6)
MCHC RBC-ENTMCNC: 26.4 PG — LOW (ref 27–34)
MCHC RBC-ENTMCNC: 31.7 G/DL — LOW (ref 32–36)
MCV RBC AUTO: 83.4 FL — SIGNIFICANT CHANGE UP (ref 80–100)
NRBC # BLD AUTO: 0 K/UL — SIGNIFICANT CHANGE UP (ref 0–0)
NRBC # FLD: 0 K/UL — SIGNIFICANT CHANGE UP (ref 0–0)
NRBC BLD AUTO-RTO: 0 /100 WBCS — SIGNIFICANT CHANGE UP (ref 0–0)
PHOSPHATE SERPL-MCNC: 3.8 MG/DL — SIGNIFICANT CHANGE UP (ref 2.4–4.7)
PLATELET # BLD AUTO: 348 K/UL — SIGNIFICANT CHANGE UP (ref 150–400)
PMV BLD: 9.4 FL — SIGNIFICANT CHANGE UP (ref 7–13)
POTASSIUM SERPL-MCNC: 3.8 MMOL/L — SIGNIFICANT CHANGE UP (ref 3.5–5.3)
POTASSIUM SERPL-MCNC: 4.4 MMOL/L — SIGNIFICANT CHANGE UP (ref 3.5–5.3)
POTASSIUM SERPL-SCNC: 3.8 MMOL/L — SIGNIFICANT CHANGE UP (ref 3.5–5.3)
POTASSIUM SERPL-SCNC: 4.4 MMOL/L — SIGNIFICANT CHANGE UP (ref 3.5–5.3)
RBC # BLD: 4.35 M/UL — SIGNIFICANT CHANGE UP (ref 3.8–5.2)
RBC # FLD: 13.9 % — SIGNIFICANT CHANGE UP (ref 10.3–14.5)
SODIUM SERPL-SCNC: 135 MMOL/L — SIGNIFICANT CHANGE UP (ref 135–145)
SODIUM SERPL-SCNC: 138 MMOL/L — SIGNIFICANT CHANGE UP (ref 135–145)
SURGICAL PATHOLOGY STUDY: SIGNIFICANT CHANGE UP
WBC # BLD: 10.54 K/UL — HIGH (ref 3.8–10.5)
WBC # FLD AUTO: 10.54 K/UL — HIGH (ref 3.8–10.5)

## 2025-06-20 PROCEDURE — 70450 CT HEAD/BRAIN W/O DYE: CPT

## 2025-06-20 PROCEDURE — 88305 TISSUE EXAM BY PATHOLOGIST: CPT

## 2025-06-20 PROCEDURE — 82962 GLUCOSE BLOOD TEST: CPT

## 2025-06-20 PROCEDURE — 84100 ASSAY OF PHOSPHORUS: CPT

## 2025-06-20 PROCEDURE — 84480 ASSAY TRIIODOTHYRONINE (T3): CPT

## 2025-06-20 PROCEDURE — 84436 ASSAY OF TOTAL THYROXINE: CPT

## 2025-06-20 PROCEDURE — 81001 URINALYSIS AUTO W/SCOPE: CPT

## 2025-06-20 PROCEDURE — 84146 ASSAY OF PROLACTIN: CPT

## 2025-06-20 PROCEDURE — 83003 ASSAY GROWTH HORMONE (HGH): CPT

## 2025-06-20 PROCEDURE — 86901 BLOOD TYPING SEROLOGIC RH(D): CPT

## 2025-06-20 PROCEDURE — 86850 RBC ANTIBODY SCREEN: CPT

## 2025-06-20 PROCEDURE — 85027 COMPLETE CBC AUTOMATED: CPT

## 2025-06-20 PROCEDURE — 83930 ASSAY OF BLOOD OSMOLALITY: CPT

## 2025-06-20 PROCEDURE — 80048 BASIC METABOLIC PNL TOTAL CA: CPT

## 2025-06-20 PROCEDURE — 70553 MRI BRAIN STEM W/O & W/DYE: CPT

## 2025-06-20 PROCEDURE — 84305 ASSAY OF SOMATOMEDIN: CPT

## 2025-06-20 PROCEDURE — 83519 RIA NONANTIBODY: CPT

## 2025-06-20 PROCEDURE — 86900 BLOOD TYPING SEROLOGIC ABO: CPT

## 2025-06-20 PROCEDURE — 82533 TOTAL CORTISOL: CPT

## 2025-06-20 PROCEDURE — C1889: CPT

## 2025-06-20 PROCEDURE — 83735 ASSAY OF MAGNESIUM: CPT

## 2025-06-20 PROCEDURE — 82672 ASSAY OF ESTROGEN: CPT

## 2025-06-20 PROCEDURE — 97167 OT EVAL HIGH COMPLEX 60 MIN: CPT

## 2025-06-20 PROCEDURE — 81025 URINE PREGNANCY TEST: CPT

## 2025-06-20 PROCEDURE — 84443 ASSAY THYROID STIM HORMONE: CPT

## 2025-06-20 PROCEDURE — 83002 ASSAY OF GONADOTROPIN (LH): CPT

## 2025-06-20 PROCEDURE — 83935 ASSAY OF URINE OSMOLALITY: CPT

## 2025-06-20 PROCEDURE — 81003 URINALYSIS AUTO W/O SCOPE: CPT

## 2025-06-20 PROCEDURE — 36415 COLL VENOUS BLD VENIPUNCTURE: CPT

## 2025-06-20 PROCEDURE — 82024 ASSAY OF ACTH: CPT

## 2025-06-20 PROCEDURE — 83001 ASSAY OF GONADOTROPIN (FSH): CPT

## 2025-06-20 RX ORDER — MONTELUKAST SODIUM 10 MG/1
1 TABLET ORAL
Qty: 0 | Refills: 0 | DISCHARGE
Start: 2025-06-20

## 2025-06-20 RX ORDER — MONTELUKAST SODIUM 10 MG/1
1 TABLET ORAL
Refills: 0 | DISCHARGE

## 2025-06-20 RX ORDER — SODIUM CHLORIDE 0.65 %
1 AEROSOL, SPRAY (ML) NASAL
Qty: 0 | Refills: 0 | DISCHARGE
Start: 2025-06-20

## 2025-06-20 RX ADMIN — Medication 1 SPRAY(S): at 02:08

## 2025-06-20 RX ADMIN — POLYETHYLENE GLYCOL 3350 17 GRAM(S): 17 POWDER, FOR SOLUTION ORAL at 10:24

## 2025-06-20 RX ADMIN — Medication 1 SPRAY(S): at 08:23

## 2025-06-20 RX ADMIN — Medication 1 SPRAY(S): at 05:09

## 2025-06-20 RX ADMIN — Medication 108 MILLIGRAM(S): at 05:08

## 2025-06-20 RX ADMIN — Medication 1 SPRAY(S): at 10:04

## 2025-06-20 RX ADMIN — MONTELUKAST SODIUM 10 MILLIGRAM(S): 10 TABLET ORAL at 11:44

## 2025-06-20 RX ADMIN — Medication 1 SPRAY(S): at 11:44

## 2025-06-20 RX ADMIN — Medication 650 MILLIGRAM(S): at 09:20

## 2025-06-20 RX ADMIN — DULOXETINE 60 MILLIGRAM(S): 20 CAPSULE, DELAYED RELEASE ORAL at 11:44

## 2025-06-20 RX ADMIN — OXYCODONE HYDROCHLORIDE 10 MILLIGRAM(S): 30 TABLET ORAL at 00:00

## 2025-06-20 RX ADMIN — Medication 650 MILLIGRAM(S): at 08:21

## 2025-06-20 RX ADMIN — Medication 1 APPLICATION(S): at 11:43

## 2025-06-20 NOTE — PROGRESS NOTE ADULT - PROBLEM SELECTOR PLAN 1
-Gustafson splints removed  -Neilmed instructions given  -Has f/u w Dr. Weinberg 7/1  -No nose blowing or heavy lifting
-Nasal saline sprays Q2 hours (can keep at bedside for frequent use)  -Cont ancef while feng splints in place (will remove as early as Friday pending hospital course)   -Skullbase precautions  -monitor for CSF leak  -Strict Is/Os DI watch  -Further plan of care per neurosx and endocrine
-Nasal saline sprays Q2 hours (can keep at bedside for frequent use)  -Cont ancef while feng splints in place (will remove as early as Friday pending hospital course)   -Skullbase precautions  -monitor for CSF leak  -Strict Is/Os DI watch  -Further plan of care per neurosx and endocrine.

## 2025-06-20 NOTE — DIETITIAN INITIAL EVALUATION ADULT - PERTINENT MEDS FT
MEDICATIONS  (STANDING):  dextrose 50% Injectable 12.5 Gram(s) IV Push once  glucagon  Injectable 1 milliGRAM(s) IntraMuscular once  insulin lispro (ADMELOG) corrective regimen sliding scale   SubCutaneous three times a day before meals  melatonin 10 milliGRAM(s) Oral at bedtime  senna 2 Tablet(s) Oral at bedtime  sodium chloride 0.65% Nasal 1 Spray(s) Both Nostrils every 2 hours    MEDICATIONS  (PRN):  labetalol Injectable 10 milliGRAM(s) IV Push every 2 hours PRN Systolic blood pressure > 160

## 2025-06-20 NOTE — DIETITIAN INITIAL EVALUATION ADULT - NS FNS DIET ORDER
Diet, Regular:   Consistent Carbohydrate {No Snacks} (CSTCHO)  [place] Vegetarian (Accepts Fish)  No Beef  No Pork  No Poultry (06-19-25 @ 03:46)

## 2025-06-20 NOTE — DISCHARGE NOTE NURSING/CASE MANAGEMENT/SOCIAL WORK - PATIENT PORTAL LINK FT
You can access the FollowMyHealth Patient Portal offered by [facility] by registering at the following website: [email or web address]. By joining [facility]’s FollowMyHealth portal, you will also be able to view your health information using other applications (apps) compatible with our system.

## 2025-06-20 NOTE — DIETITIAN NUTRITION RISK NOTIFICATION - TREATMENT: THE FOLLOWING DIET HAS BEEN RECOMMENDED
Diet, Regular:   Consistent Carbohydrate {No Snacks} (CSTCHO)  [employer] Vegetarian (Accepts Fish)  No Beef  No Pork  No Poultry (06-19-25 @ 03:46) [Active]

## 2025-06-20 NOTE — OCCUPATIONAL THERAPY INITIAL EVALUATION ADULT - PERTINENT HX OF CURRENT PROBLEM, REHAB EVAL
As per MD note: Pt is a pleasant 35 y/o female, PMH DM2, fibromyalgia, asthma, psoriatic arthritis, presents to [facility] with c/o benign neoplasm of pituitary gland.  Per surgeon's note "She originally presented to [facility] in 12/2023 with headache, and she was found to have a sellar mass (2.2 x 1.0 x 1.7 cm). She was planned to undergo surgical resection, but she had follow up MRI which showed a decrease in the lesion, now 1.5 x 0.9 x 0.6 cm. Her intervention was deferred, and she now presents for follow up. She had a repeat MRI w/wo which showed stable size of lesion (1.5 x 0.9 x 0.7 cm) but with contrast hyperintensity within the R side of the lesion (previously L side hyperintensity). She admits to intermittent headaches, often with her menstrual cycle. She also admitted to an episode of flashing lights within her peripheral vision which has now resolved".  Pt explains concern mass may have grown, now scheduled for intervention.  Pt reports feeling generally well, denies recent fever/cough/cold, infection or abx use.  Currently pt denies numbness/tingling/weakness, difficulty ambulating / speaking, dizziness, visual changes.  Scheduled for Endoscopic Transphenoidal Resection of Pituitary Mass on 6.18.2025 with Dr. Dominguez, pending medical and endocrinology optimization.

## 2025-06-20 NOTE — PROGRESS NOTE ADULT - SUBJECTIVE AND OBJECTIVE BOX
HPI:  Pt is a pleasant 35 y/o female, PMH DM2, fibromyalgia, asthma, psoriatic arthritis, presents to PST with c/o benign neoplasm of pituitary gland.  Per surgeon's note "She originally presented to [facility] in 12/2023 with headache, and she was found to have a sellar mass (2.2 x 1.0 x 1.7 cm). She was planned to undergo surgical resection, but she had follow up MRI which showed a decrease in the lesion, now 1.5 x 0.9 x 0.6 cm. Her intervention was deferred, and she now presents for follow up. She had a repeat MRI w/wo which showed stable size of lesion (1.5 x 0.9 x 0.7 cm) but with contrast hyperintensity within the R side of the lesion (previously L side hyperintensity). She admits to intermittent headaches, often with her menstrual cycle. She also admitted to an episode of flashing lights within her peripheral vision which has now resolved".  Pt explains concern mass may have grown, now scheduled for intervention.  Pt reports feeling generally well, denies recent fever/cough/cold, infection or abx use.  Currently pt denies numbness/tingling/weakness, difficulty ambulating / speaking, dizziness, visual changes.  Scheduled for Endoscopic Transphenoidal Resection of Pituitary Mass on 6.18.2025 with Dr. Dominguez, pending medical and endocrinology optimization.  (29 May 2025 12:46)        INTERVAL HPI/OVERNIGHT EVENTS:  34y Female seen and examined at bedside. patient reports is doing well, notes no acute drainage from b/l nares. Denies metallic taste. Patient complaining of aaron discomfort.     Vital Signs Last 24 Hrs  T(C): 36.6 (20 Jun 2025 07:58), Max: 36.8 (19 Jun 2025 12:07)  T(F): 97.9 (20 Jun 2025 07:58), Max: 98.3 (19 Jun 2025 12:07)  HR: 103 (20 Jun 2025 09:00) (76 - 103)  BP: 137/81 (20 Jun 2025 09:00) (110/68 - 154/76)  BP(mean): 98 (20 Jun 2025 09:00) (77 - 109)  RR: 19 (20 Jun 2025 09:00) (17 - 28)  SpO2: 98% (20 Jun 2025 09:00) (95% - 100%)    Parameters below as of 20 Jun 2025 08:00  Patient On (Oxygen Delivery Method): room air    GENERAL: NAD, well-groomed  NANCY COMA SCORE: E- V- M- = 15   MENTAL STATUS: AOx3; Awake; Opens eyes spontaneously; Appropriately conversant without aphasia; following simple commands  CRANIAL NERVES: PERRL. EOMI without nystagmus. Face symmetric w/ normal eye closure and smile, tongue midline. Hearing grossly intact. Speech clear. Head turning and shoulder shrug intact.   MOTOR: strength 5/5 b/l upper and lower extremities  EXTREMITIES:  no clubbing, cyanosis, or edema  SKIN: Warm, dry    LABS:                        11.5   10.54 )-----------( 348      ( 20 Jun 2025 02:00 )             36.3     06-20    138  |  104  |  12.8  ----------------------------<  119[H]  4.4   |  22.0  |  0.50    Ca    7.9[L]      20 Jun 2025 02:00  Phos  3.8     06-20  Mg     2.1     06-20        Urinalysis Basic - ( 20 Jun 2025 02:00 )    Color: x / Appearance: x / SG: x / pH: x  Gluc: 119 mg/dL / Ketone: x  / Bili: x / Urobili: x   Blood: x / Protein: x / Nitrite: x   Leuk Esterase: x / RBC: x / WBC x   Sq Epi: x / Non Sq Epi: x / Bacteria: x        06-19 @ 07:01  -  06-20 @ 07:00  --------------------------------------------------------  IN: 4223.2 mL / OUT: 5025 mL / NET: -801.8 mL        RADIOLOGY & ADDITIONAL TESTS:   MR Reed alone w/wo IV Cont (06.19.25 @ 10:48)   IMPRESSION:    1.  Postsurgical changes with excellent decompression of the optic chiasm.    
ENT ISSUE/POD: S/p enodnasal transphenoidal resection of pituitary cystic lesion with septoplasty POD0     Interval HPI:  Pt seen post operatively, awake, denies vision changes since before surgery. Pain controlled at this time. No dripping from nose or salty/metallic taste    PAST MEDICAL & SURGICAL HISTORY:  DM (diabetes mellitus)      Arthritis      Fibromyalgia      Benign neoplasm of pituitary gland      Asthma      Anemia      Psoriatic arthritis      History of cholecystectomy        Allergies    Keflex (Unknown)    Intolerances      MEDICATIONS  (STANDING):  clindamycin IVPB 600 milliGRAM(s) IV Intermittent every 8 hours  dextrose 5%. 1000 milliLiter(s) (100 mL/Hr) IV Continuous <Continuous>  dextrose 5%. 1000 milliLiter(s) (50 mL/Hr) IV Continuous <Continuous>  dextrose 50% Injectable 25 Gram(s) IV Push once  dextrose 50% Injectable 12.5 Gram(s) IV Push once  dextrose 50% Injectable 25 Gram(s) IV Push once  DULoxetine 60 milliGRAM(s) Oral daily  glucagon  Injectable 1 milliGRAM(s) IntraMuscular once  insulin lispro (ADMELOG) corrective regimen sliding scale   SubCutaneous three times a day before meals  montelukast 10 milliGRAM(s) Oral daily  polyethylene glycol 3350 17 Gram(s) Oral daily  senna 2 Tablet(s) Oral at bedtime  sodium chloride 0.9%. 1000 milliLiter(s) (70 mL/Hr) IV Continuous <Continuous>    MEDICATIONS  (PRN):  acetaminophen     Tablet .. 650 milliGRAM(s) Oral every 6 hours PRN Temp greater or equal to 38C (100.4F), Mild Pain (1 - 3)  albuterol    90 MICROgram(s) HFA Inhaler 2 Puff(s) Inhalation every 6 hours PRN Shortness of Breath and/or Wheezing  dextrose Oral Gel 15 Gram(s) Oral once PRN Blood Glucose LESS THAN 70 milliGRAM(s)/deciliter  hydrALAZINE Injectable 10 milliGRAM(s) IV Push every 2 hours PRN SBP > 160mm Hg  HYDROmorphone  Injectable 0.5 milliGRAM(s) IV Push every 6 hours PRN Severe Pain (7 - 10)  labetalol Injectable 10 milliGRAM(s) IV Push every 2 hours PRN Systolic blood pressure > 160  oxyCODONE    IR 5 milliGRAM(s) Oral every 4 hours PRN Moderate Pain (4 - 6)  oxyCODONE    IR 10 milliGRAM(s) Oral every 4 hours PRN Severe Pain (7 - 10)         ROS:   ENT: all negative except as noted in HPI   Pulm: denies SOB, cough, hemoptysis  Neuro: denies numbness/tingling, loss of sensation  Endo: denies heat/cold intolerance, excessive sweating      Vital Signs Last 24 Hrs  T(C): 36.9 (18 Jun 2025 12:40), Max: 36.9 (18 Jun 2025 12:40)  T(F): 98.4 (18 Jun 2025 12:40), Max: 98.4 (18 Jun 2025 12:40)  HR: 93 (18 Jun 2025 13:00) (86 - 97)  BP: 127/73 (18 Jun 2025 13:00) (112/75 - 149/83)  BP(mean): 87 (18 Jun 2025 13:00) (86 - 90)  RR: 15 (18 Jun 2025 13:00) (14 - 21)  SpO2: 97% (18 Jun 2025 13:00) (97% - 100%)    Parameters below as of 18 Jun 2025 12:40  Patient On (Oxygen Delivery Method): room air                              12.5   13.83 )-----------( 333      ( 18 Jun 2025 13:34 )             39.3    06-18    135  |  99  |  6.5[L]  ----------------------------<  161[H]  4.3   |  15.0[L]  |  0.50    Ca    7.8[L]      18 Jun 2025 13:34  Phos  3.6     06-18  Mg     2.1     06-18         PHYSICAL EXAM:  Gen: NAD  Skin: No rashes, bruises, or lesions  Head: Normocephalic, Atraumatic  Face: no edema, erythema, or fluctuance. Parotid glands soft without mass  Eyes: no scleral injection  Nose: B/ nasal splints in place, no drainage  Mouth: No Stridor / Drooling / Trismus.  Mucosa moist, tongue/uvula midline, oropharynx clear  Neck: Flat, supple, no lymphadenopathy, trachea midline, no masses  Lymphatic: No lymphadenopathy  Resp: breathing easily, no stridor  Neuro: facial nerve intact, no facial droop        
ENT ISSUE/POD: s/p endonasal transphenoidal resection of pituitary cyst POD1    Interval HPI: Pt feels good, feels pain is controlled. No nasal dc or salty/metallic taste. Denies vision changes          PAST MEDICAL & SURGICAL HISTORY:  DM (diabetes mellitus)      Arthritis      Fibromyalgia      Benign neoplasm of pituitary gland      Asthma      Anemia      Psoriatic arthritis      History of cholecystectomy        Allergies    Keflex (Unknown)    Intolerances      MEDICATIONS  (STANDING):  chlorhexidine 2% Cloths 1 Application(s) Topical daily  clindamycin IVPB 600 milliGRAM(s) IV Intermittent every 8 hours  dextrose 5%. 1000 milliLiter(s) (100 mL/Hr) IV Continuous <Continuous>  dextrose 5%. 1000 milliLiter(s) (50 mL/Hr) IV Continuous <Continuous>  dextrose 50% Injectable 25 Gram(s) IV Push once  dextrose 50% Injectable 12.5 Gram(s) IV Push once  dextrose 50% Injectable 25 Gram(s) IV Push once  DULoxetine 60 milliGRAM(s) Oral daily  glucagon  Injectable 1 milliGRAM(s) IntraMuscular once  insulin lispro (ADMELOG) corrective regimen sliding scale   SubCutaneous three times a day before meals  melatonin 10 milliGRAM(s) Oral at bedtime  montelukast 10 milliGRAM(s) Oral daily  polyethylene glycol 3350 17 Gram(s) Oral daily  senna 2 Tablet(s) Oral at bedtime  sodium chloride 0.9%. 1000 milliLiter(s) (70 mL/Hr) IV Continuous <Continuous>    MEDICATIONS  (PRN):  acetaminophen     Tablet .. 650 milliGRAM(s) Oral every 6 hours PRN Temp greater or equal to 38C (100.4F), Mild Pain (1 - 3)  albuterol    90 MICROgram(s) HFA Inhaler 2 Puff(s) Inhalation every 6 hours PRN Shortness of Breath and/or Wheezing  dextrose Oral Gel 15 Gram(s) Oral once PRN Blood Glucose LESS THAN 70 milliGRAM(s)/deciliter  hydrALAZINE Injectable 10 milliGRAM(s) IV Push every 2 hours PRN SBP > 160mm Hg  HYDROmorphone  Injectable 0.5 milliGRAM(s) IV Push every 6 hours PRN Severe Pain (7 - 10)  labetalol Injectable 10 milliGRAM(s) IV Push every 2 hours PRN Systolic blood pressure > 160  oxyCODONE    IR 5 milliGRAM(s) Oral every 4 hours PRN Moderate Pain (4 - 6)  oxyCODONE    IR 10 milliGRAM(s) Oral every 4 hours PRN Severe Pain (7 - 10)         ROS:   ENT: all negative except as noted in HPI   Pulm: denies SOB, cough, hemoptysis  Neuro: denies numbness/tingling, loss of sensation  Endo: denies heat/cold intolerance, excessive sweating      Vital Signs Last 24 Hrs  T(C): 36.7 (19 Jun 2025 07:51), Max: 36.9 (18 Jun 2025 12:40)  T(F): 98.1 (19 Jun 2025 07:51), Max: 98.4 (18 Jun 2025 12:40)  HR: 86 (19 Jun 2025 09:00) (81 - 103)  BP: 123/64 (19 Jun 2025 09:00) (111/52 - 135/73)  BP(mean): 81 (19 Jun 2025 09:00) (66 - 92)  RR: 19 (19 Jun 2025 09:00) (14 - 22)  SpO2: 98% (19 Jun 2025 09:00) (96% - 100%)    Parameters below as of 19 Jun 2025 08:00  Patient On (Oxygen Delivery Method): room air                              11.6   10.17 )-----------( 353      ( 19 Jun 2025 02:40 )             36.5    06-19    138  |  106  |  6.8[L]  ----------------------------<  183[H]  4.2   |  18.0[L]  |  0.46[L]    Ca    8.0[L]      19 Jun 2025 07:52  Phos  2.1     06-19  Mg     2.4     06-19         PHYSICAL EXAM:  Gen: NAD  Skin: No rashes, bruises, or lesions  Head: Normocephalic, Atraumatic  Face: no edema, erythema, or fluctuance. Parotid glands soft without mass  Eyes: no scleral injection  Nose: Gustafson splints b/l, mild dried blood b/l  Mouth: No Stridor / Drooling / Trismus.  Mucosa moist, tongue/uvula midline, oropharynx clear  Neck: Flat, supple, no lymphadenopathy, trachea midline, no masses  Lymphatic: No lymphadenopathy  Resp: breathing easily, no stridor  Neuro: facial nerve intact, no facial droop        
HPI:  Pt is a pleasant 33 y/o female, PMH DM2, fibromyalgia, asthma, psoriatic arthritis, presents to PST with c/o benign neoplasm of pituitary gland.  Per surgeon's note "She originally presented to [facility] in 12/2023 with headache, and she was found to have a sellar mass (2.2 x 1.0 x 1.7 cm). She was planned to undergo surgical resection, but she had follow up MRI which showed a decrease in the lesion, now 1.5 x 0.9 x 0.6 cm. Her intervention was deferred, and she now presents for follow up. She had a repeat MRI w/wo which showed stable size of lesion (1.5 x 0.9 x 0.7 cm) but with contrast hyperintensity within the R side of the lesion (previously L side hyperintensity). She admits to intermittent headaches, often with her menstrual cycle. She also admitted to an episode of flashing lights within her peripheral vision which has now resolved".  Pt explains concern mass may have grown, now scheduled for intervention.  Pt reports feeling generally well, denies recent fever/cough/cold, infection or abx use.  Currently pt denies numbness/tingling/weakness, difficulty ambulating / speaking, dizziness, visual changes.  Scheduled for Endoscopic Transphenoidal Resection of Pituitary Mass on 6.18.2025 with Dr. Dominguez, pending medical and endocrinology optimization.  (29 May 2025 12:46)    POD 1    INTERVAL HPI/OVERNIGHT EVENTS:  34y Female s/p TSP for Rathke's Cleft cyst removal, seen lying comfortably in bed. Denies headache, weakness, numbness, n/v/d, fevers, chills, chest pain, SOB. OU appropriate.    ICU Vital Signs Last 24 Hrs  T(C): 36.8 (19 Jun 2025 00:13), Max: 36.9 (18 Jun 2025 12:40)  T(F): 98.3 (19 Jun 2025 00:13), Max: 98.4 (18 Jun 2025 12:40)  HR: 86 (19 Jun 2025 00:00) (82 - 103)  BP: 113/60 (19 Jun 2025 00:00) (111/52 - 149/83)  BP(mean): 66 (19 Jun 2025 00:00) (66 - 90)  ABP: --  ABP(mean): --  RR: 21 (19 Jun 2025 00:00) (14 - 22)  SpO2: 97% (19 Jun 2025 00:00) (96% - 100%)    O2 Parameters below as of 19 Jun 2025 00:00  Patient On (Oxygen Delivery Method): room air    PHYSICAL EXAM:  GENERAL: NAD, well-groomed  NANCY COMA SCORE: E- V- M- = 15   MENTAL STATUS: AOx3; Awake; Opens eyes spontaneously; Appropriately conversant without aphasia; following simple commands  CRANIAL NERVES: PERRL. EOMI without nystagmus. Face symmetric w/ normal eye closure and smile, tongue midline. Hearing grossly intact. Speech clear. Head turning and shoulder shrug intact.   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      
POST OP NOTE    HPI: Pt is a pleasant 35 y/o female, PMH DM2, fibromyalgia, asthma, psoriatic arthritis, presents to PST with c/o benign neoplasm of pituitary gland.  Per surgeon's note "She originally presented to [facility] in 12/2023 with headache, and she was found to have a sellar mass (2.2 x 1.0 x 1.7 cm). She was planned to undergo surgical resection, but she had follow up MRI which showed a decrease in the lesion, now 1.5 x 0.9 x 0.6 cm. Her intervention was deferred, and she now presents for follow up. She had a repeat MRI w/wo which showed stable size of lesion (1.5 x 0.9 x 0.7 cm) but with contrast hyperintensity within the R side of the lesion (previously L side hyperintensity). She admits to intermittent headaches, often with her menstrual cycle. She also admitted to an episode of flashing lights within her peripheral vision which has now resolved".  Pt explains concern mass may have grown, now scheduled for intervention.  Pt reports feeling generally well, denies recent fever/cough/cold, infection or abx use.  Currently pt denies numbness/tingling/weakness, difficulty ambulating / speaking, dizziness, visual changes.  Scheduled for Endoscopic Transphenoidal Resection of Pituitary Mass on 6.18.2025 with Dr. Dominguez, pending medical and endocrinology optimization.  (29 May 2025 12:46)    INTERVAL HPI/OVERNIGHT EVENTS:  34y Female s/p TSP for Rathke's Cleft cyst removal, seen lying comfortably in bed. Tolerating diet. Jha in with clear urine. Complaining of nasal soreness. Denies headache, weakness, numbness, n/v/d, fevers, chills, chest pain, SOB.     Vital Signs Last 24 Hrs  T(C): 36.9 (18 Jun 2025 12:40), Max: 36.9 (18 Jun 2025 12:40)  T(F): 98.4 (18 Jun 2025 12:40), Max: 98.4 (18 Jun 2025 12:40)  HR: 93 (18 Jun 2025 13:00) (86 - 97)  BP: 127/73 (18 Jun 2025 13:00) (112/75 - 149/83)  BP(mean): 87 (18 Jun 2025 13:00) (86 - 90)  RR: 15 (18 Jun 2025 13:00) (14 - 21)  SpO2: 97% (18 Jun 2025 13:00) (97% - 100%)    Parameters below as of 18 Jun 2025 12:40  Patient On (Oxygen Delivery Method): room air    PHYSICAL EXAM:  GENERAL: NAD, well-groomed  NANCY COMA SCORE: E- V- M- = 15   MENTAL STATUS: AAO x3; Awake; Opens eyes spontaneously; Appropriately conversant without aphasia; following simple commands  CRANIAL NERVES:PERRL. EOMI without nystagmus. Face symmetric w/ normal eye closure and smile, tongue midline. Hearing grossly intact. Speech clear. Head turning and shoulder shrug intact.   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      
ENT ISSUE/POD: s/p endonasal resection of rathke's cleft cyst and septoplasty POD2    HPI: Pt feels well. No leakage from nose      PAST MEDICAL & SURGICAL HISTORY:  DM (diabetes mellitus)      Arthritis      Fibromyalgia      Benign neoplasm of pituitary gland      Asthma      Anemia      Psoriatic arthritis      History of cholecystectomy        Allergies    Keflex (Hives (Mild))    Intolerances      MEDICATIONS  (STANDING):  chlorhexidine 2% Cloths 1 Application(s) Topical daily  clindamycin IVPB 600 milliGRAM(s) IV Intermittent every 8 hours  dextrose 5%. 1000 milliLiter(s) (100 mL/Hr) IV Continuous <Continuous>  dextrose 5%. 1000 milliLiter(s) (50 mL/Hr) IV Continuous <Continuous>  dextrose 50% Injectable 25 Gram(s) IV Push once  dextrose 50% Injectable 12.5 Gram(s) IV Push once  dextrose 50% Injectable 25 Gram(s) IV Push once  DULoxetine 60 milliGRAM(s) Oral daily  enoxaparin Injectable 40 milliGRAM(s) SubCutaneous <User Schedule>  glucagon  Injectable 1 milliGRAM(s) IntraMuscular once  insulin lispro (ADMELOG) corrective regimen sliding scale   SubCutaneous three times a day before meals  melatonin 10 milliGRAM(s) Oral at bedtime  montelukast 10 milliGRAM(s) Oral daily  polyethylene glycol 3350 17 Gram(s) Oral daily  senna 2 Tablet(s) Oral at bedtime  sodium chloride 0.65% Nasal 1 Spray(s) Both Nostrils every 2 hours    MEDICATIONS  (PRN):  acetaminophen     Tablet .. 650 milliGRAM(s) Oral every 6 hours PRN Temp greater or equal to 38C (100.4F), Mild Pain (1 - 3)  albuterol    90 MICROgram(s) HFA Inhaler 2 Puff(s) Inhalation every 6 hours PRN Shortness of Breath and/or Wheezing  dextrose Oral Gel 15 Gram(s) Oral once PRN Blood Glucose LESS THAN 70 milliGRAM(s)/deciliter  hydrALAZINE Injectable 10 milliGRAM(s) IV Push every 2 hours PRN SBP > 160mm Hg  labetalol Injectable 10 milliGRAM(s) IV Push every 2 hours PRN Systolic blood pressure > 160  oxyCODONE    IR 5 milliGRAM(s) Oral every 4 hours PRN Moderate Pain (4 - 6)  oxyCODONE    IR 10 milliGRAM(s) Oral every 4 hours PRN Severe Pain (7 - 10)      ROS:   ENT: all negative except as noted in HPI   Pulm: denies SOB, cough, hemoptysis  Neuro: denies numbness/tingling, loss of sensation  Endo: denies heat/cold intolerance, excessive sweating      Vital Signs Last 24 Hrs  T(C): 36.8 (20 Jun 2025 11:58), Max: 36.8 (19 Jun 2025 16:38)  T(F): 98.3 (20 Jun 2025 11:58), Max: 98.3 (19 Jun 2025 16:38)  HR: 99 (20 Jun 2025 12:00) (76 - 103)  BP: 141/69 (20 Jun 2025 12:00) (110/68 - 141/84)  BP(mean): 90 (20 Jun 2025 12:00) (77 - 109)  RR: 15 (20 Jun 2025 12:00) (15 - 28)  SpO2: 98% (20 Jun 2025 12:00) (95% - 100%)    Parameters below as of 20 Jun 2025 08:00  Patient On (Oxygen Delivery Method): room air                              11.5   10.54 )-----------( 348      ( 20 Jun 2025 02:00 )             36.3    06-20    135  |  97  |  11.3  ----------------------------<  196[H]  3.8   |  22.0  |  0.57    Ca    8.3[L]      20 Jun 2025 10:00  Phos  3.8     06-20  Mg     2.1     06-20         PHYSICAL EXAM:  Gen: NAD  Skin: No rashes, bruises, or lesions  Head: Normocephalic, Atraumatic  Face: no edema, erythema, or fluctuance. Parotid glands soft without mass  Eyes: no scleral injection  Nose: b/l feng splints remove, minimal old blood and mucous   Mouth: No Stridor / Drooling / Trismus.  Mucosa moist, tongue/uvula midline, oropharynx clear  Neck: Flat, supple, no lymphadenopathy, trachea midline, no masses  Lymphatic: No lymphadenopathy  Resp: breathing easily, no stridor  Neuro: facial nerve intact, no facial droop        
POST-OPERATIVE NOTE    Procedure: Endoscopic transphenoidal or transnasal resection of pituitary tumor    Diagnosis/Indication: pituitary mass    Surgeon: Dr. Cornelio Dominguez, Dr. Da Weinberg    INTERVAL HPI/ACUTE EVENTS:  34yFemale PMH admitted with now s/p endoscopic transphenoidal or transnasal resection of pituitary tumor POD#0. Patient seen lying comfortably in bed. Patient admits to mild headache and some shallow breathing. Pt denies dizziness, changes in vision, chest pain, abdominal pain, numbness/tingling/weakness in any extremity. Pain controlled with medication.    VITALS:  T(C): 36.9 (06-18-25 @ 12:40), Max: 36.9 (06-18-25 @ 12:40)  HR: 93 (06-18-25 @ 13:00) (86 - 97)  BP: 127/73 (06-18-25 @ 13:00) (112/75 - 149/83)  RR: 15 (06-18-25 @ 13:00) (14 - 21)  SpO2: 97% (06-18-25 @ 13:00) (97% - 100%)  Wt(kg): --    PHYSICAL EXAM:  General: patient seen laying supine in bed in NAD  Neuro: AAOx3, FC, OE spontaneously, speech clear and fluent, CNII-XI grossly intact, face symmetric, no pronator drift, strength 5/5 b/l UE and LE, sensation grossly intact to light touch throughout. Visual fields all intact  HEENT: PERRL, EOMI  Cardiac: RRR, S1S2  Pulmonary: chest rise symmetric  Abdomen: soft, nontender, nondistended  Ext: perfusing well  Skin: warm, dry    LABS:                        12.5   13.83 )-----------( 333      ( 18 Jun 2025 13:34 )             39.3             RADIOLOGY/OTHER:  < from: MR Sella alone w/wo IV Cont (12.12.23 @ 19:38) >  Cystic peripherally enhancing sellar mass with suprasellar extension   measures 2.2 x 1.0 x 1.7 cm. The mass abuts the optic chiasm. Transit T1   hyperintensity along the base of the mass. Findings suggest a large   Rathke's cleft cyst versus cystic macroadenoma. Mild right cavernous   sinus extension.    No abnormal leptomeningeal or parenchymal enhancement.  There is no abnormal restricted diffusion to suggest acute infarction. No   abnormal signal is demonstrated throughout the brain parenchyma. Normal   T2 flow-voids are seen within  the intracranial vasculature. The lateral   ventricles and cortical sulci are age-appropriate in size and   configuration. There is no other mass, mass effect, or extra-axial fluid   collection. There is no susceptibility artifact to suggest hemorrhage.   Midline structures are normal.  The visualized paranasal sinuses, mastoid   air cells and orbits are unremarkable.      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    < end of copied text >

## 2025-06-20 NOTE — PROGRESS NOTE ADULT - ASSESSMENT
33 yo F s/p endonasal transphenoidal resection of pituitary cystic lesion pod0 doing well 
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 Avoid leaning forward. Avoid straining. No incentive spirometry   - Record BM's. Miralax/Senna  - Full endocrine hormone panel in AM; Serum cortisol, prolactin, ACTH, insulin growth factor 1, GH, FSH, LH, T3 uptake, T4 serum, TSH, Testosterone  - Endocrine following   - PT/OT: assessed and evaluated as functionally independent  - Nasal rinse to begin tomorrow  - ENT following  - b/l SCDs; hold AC/AP/chemical DVT prophylaxis at this time  - Medical management/supportive care per NSICU  - D/w Dr. Dominguez
34y Female s/p TSP for Rathke's Cleft cyst removal    PLAN  Plan  - Q1 neuro checks  - CTH in AM  - Pain control PRN; avoid oversedation  - Normotensive  - Advance diet as tolerated  - Resume home meds when tolerating diet  - Strict ins/outs, DI watch  - BMP q 6  - Clindamycin until nasal splints removed by ENT  - CSF leak precautions: no straws, no blowing nose, avoid coughing/sneezing, no nasal cannula/nasal swabs/COVID swabs. Avoid leaning forward. Avoid straining. No incentive spirometry   - Record BM's. Miralax/Senna  - Full endocrine hormone panel in AM; Serum cortisol, prolactin, ACTH, insulin growth factor 1, GH, FSH, LH, T3 uptake, T4 serum, TSH, Testosterone  - Endocrine consult in AM  - PT/OT  - MRI Brain w/w/o contrast ordered for AM  - Nasal rinse to begin tomorrow  - ENT following  - b/l SCDs; hold AC/AP/chemical DVT prophylaxis at this time  - Medical management/supportive care per NSICU  - D/w Dr. Dominguez
35 y/o female, PMH DM2, fibromyalgia, asthma, psoriatic arthritis, presents with presents w/ benign neoplasm of pituitary gland, now s/p TSP for resection of pituitary tumor/cyst.   POD#0    -Q1 hour neuro checks, pain control, CTH to be done in AM, MRI brain w/wo. TSP precautions  -HOB 30 degrees  -Pituitary labs, STRICT I/O, DI watch. frequent BMPs  -Pt will need endocrine consult  -Remove aaron tomorrow  -Clindamycin  -ADAT  -Hold chemoppx for DVT a this time until cleared by Dr. Dominguez, SCDs    Case discussed with Dr. Dominguez  
33 yo F s/p transphenoidal resection of rathke's cleft cyst and septoplasty 6/18/25 feng splints now removed 
34y Female s/p TSP for Rathke's Cleft cyst removal  POD 1    PLAN  Plan  - Q1 neuro checks  - CTH in AM  - MR Reed w/wo post op  - Pain control PRN; avoid oversedation  - Normotensive  - Regular diet  - Resume home meds when tolerating diet  - Strict ins/outs, DI watch  - BMP q 6  - Clindamycin until nasal splints removed by ENT  - CSF leak precautions: no straws, no blowing nose, avoid coughing/sneezing, no nasal cannula/nasal swabs/COVID swabs. Avoid leaning forward. Avoid straining. No incentive spirometry   - Record BM's. Miralax/Senna  - Full endocrine hormone panel in AM; Serum cortisol, prolactin, ACTH, insulin growth factor 1, GH, FSH, LH, T3 uptake, T4 serum, TSH, Testosterone  - Endocrine consult in AM  - PT/OT  - Nasal rinse to begin tomorrow  - ENT following  - b/l SCDs; hold AC/AP/chemical DVT prophylaxis at this time  - Medical management/supportive care per NSICU  - D/w Dr. Dominguez
35 yo F s/p endonasal transphenoidal resection of pituitary cystic lesion 6/18/25 doing well

## 2025-06-20 NOTE — PROGRESS NOTE ADULT - PROBLEM SELECTOR PROBLEM 1
Benign neoplasm of pituitary gland

## 2025-06-20 NOTE — DIETITIAN INITIAL EVALUATION ADULT - FUNCTIONAL SCREEN CURRENT LEVEL: SWALLOWING (IF SCORE 2 OR MORE FOR ANY ITEM, CONSULT REHAB SERVICES), MLM)
0 = swallows foods/liquids without difficulty
Awaiting labs, xray results. Anticipate dc home.  Edel Agosto MD

## 2025-06-20 NOTE — DIETITIAN INITIAL EVALUATION ADULT - PERTINENT LABORATORY DATA
06-20    135  |  97  |  11.3  ----------------------------<  196[H]  3.8   |  22.0  |  0.57    Ca    8.3[L]      20 Jun 2025 10:00  Phos  3.8     06-20  Mg     2.1     06-20    POCT Blood Glucose.: 127 mg/dL (06-20-25 @ 08:02)  A1C with Estimated Average Glucose Result: 5.9 % (05-29-25 @ 12:45)

## 2025-06-20 NOTE — DISCHARGE NOTE NURSING/CASE MANAGEMENT/SOCIAL WORK - FINANCIAL ASSISTANCE
[facility] provides services at a reduced cost to those who are determined to be eligible through [facility]’s financial assistance program. Information regarding [facility]’s financial assistance program can be found by going to [email or web address] or by calling [phone].

## 2025-06-20 NOTE — DIETITIAN INITIAL EVALUATION ADULT - NSICDXPASTMEDICALHX_GEN_ALL_CORE_FT
PAST MEDICAL HISTORY:  Anemia     Arthritis     Asthma     Benign neoplasm of pituitary gland     DM (diabetes mellitus)     Fibromyalgia     Psoriatic arthritis

## 2025-06-20 NOTE — DIETITIAN INITIAL EVALUATION ADULT - ORAL INTAKE PTA/DIET HISTORY
Pt reports eating well PTA; denies any recent weight changes. Pt reports being a Pescovegetarian; taking and tolerating diet well post-op.

## 2025-06-20 NOTE — DIETITIAN INITIAL EVALUATION ADULT - OTHER INFO
Patient is a pleasant 35 y/o female, PMH DM2, fibromyalgia, asthma, psoriatic arthritis, presents to [facility] with c/o benign neoplasm of pituitary gland.  MRI w/wo which showed stable size of lesion (1.5 x 0.9 x 0.7 cm) but with contrast hyperintensity within the R side of the lesion (previously L side hyperintensity).    Pt is now s/p TSP for Rathke's Cleft cyst removal

## 2025-06-24 LAB — ESTROGEN SERPL-MCNC: 109 PG/ML — SIGNIFICANT CHANGE UP

## 2025-06-27 LAB — ALPHA SUBUNIT SERPL-MCNC: <0.15 NG/ML — SIGNIFICANT CHANGE UP

## 2025-07-01 ENCOUNTER — APPOINTMENT (OUTPATIENT)
Dept: OTOLARYNGOLOGY | Facility: CLINIC | Age: 35
End: 2025-07-01
Payer: COMMERCIAL

## 2025-07-01 VITALS
WEIGHT: 262 LBS | DIASTOLIC BLOOD PRESSURE: 83 MMHG | SYSTOLIC BLOOD PRESSURE: 127 MMHG | BODY MASS INDEX: 43.65 KG/M2 | HEART RATE: 96 BPM | HEIGHT: 65 IN

## 2025-07-01 PROCEDURE — 31237 NSL/SINS NDSC SURG BX POLYPC: CPT | Mod: 50

## 2025-07-01 PROCEDURE — 99024 POSTOP FOLLOW-UP VISIT: CPT

## 2025-07-03 ENCOUNTER — APPOINTMENT (OUTPATIENT)
Dept: NEUROSURGERY | Facility: CLINIC | Age: 35
End: 2025-07-03
Payer: COMMERCIAL

## 2025-07-03 VITALS
BODY MASS INDEX: 43.65 KG/M2 | SYSTOLIC BLOOD PRESSURE: 123 MMHG | HEART RATE: 98 BPM | WEIGHT: 262 LBS | OXYGEN SATURATION: 97 % | HEIGHT: 65 IN | TEMPERATURE: 98.2 F | DIASTOLIC BLOOD PRESSURE: 85 MMHG

## 2025-07-03 PROCEDURE — 99024 POSTOP FOLLOW-UP VISIT: CPT

## 2025-08-08 ENCOUNTER — APPOINTMENT (OUTPATIENT)
Dept: OTOLARYNGOLOGY | Facility: CLINIC | Age: 35
End: 2025-08-08

## 2025-08-08 VITALS
SYSTOLIC BLOOD PRESSURE: 138 MMHG | BODY MASS INDEX: 42.99 KG/M2 | WEIGHT: 258 LBS | HEART RATE: 94 BPM | DIASTOLIC BLOOD PRESSURE: 85 MMHG | HEIGHT: 65 IN

## 2025-08-08 DIAGNOSIS — D35.2 BENIGN NEOPLASM OF PITUITARY GLAND: ICD-10-CM

## 2025-08-08 DIAGNOSIS — J34.2 DEVIATED NASAL SEPTUM: ICD-10-CM

## 2025-08-08 PROCEDURE — 99024 POSTOP FOLLOW-UP VISIT: CPT

## 2025-08-08 PROCEDURE — 31237 NSL/SINS NDSC SURG BX POLYPC: CPT | Mod: 50,78

## 2025-08-14 ENCOUNTER — TRANSCRIPTION ENCOUNTER (OUTPATIENT)
Age: 35
End: 2025-08-14

## 2025-08-14 DIAGNOSIS — R06.83 SNORING: ICD-10-CM

## 2025-08-15 ENCOUNTER — TRANSCRIPTION ENCOUNTER (OUTPATIENT)
Age: 35
End: 2025-08-15

## 2025-08-18 ENCOUNTER — APPOINTMENT (OUTPATIENT)
Dept: OPHTHALMOLOGY | Facility: CLINIC | Age: 35
End: 2025-08-18
Payer: COMMERCIAL

## 2025-08-18 ENCOUNTER — NON-APPOINTMENT (OUTPATIENT)
Age: 35
End: 2025-08-18

## 2025-08-18 PROCEDURE — 92012 INTRM OPH EXAM EST PATIENT: CPT

## (undated) DEVICE — SNAP ON SPHERZ 5 PACK

## (undated) DEVICE — GOWN TRIMAX LG

## (undated) DEVICE — DRAPE 3/4 SHEET W REINFORCEMENT 56X77"

## (undated) DEVICE — MIDAS REX MR8 CLEARVIEW TN BALL 3MM X 13CM COARSE

## (undated) DEVICE — SOL IRR POUR H2O 1000ML

## (undated) DEVICE — ELCTR BOVIE TIP NEEDLE INSULATED 2.8" EDGE

## (undated) DEVICE — STORZ DURA MICRO KNIFE INSERT SICKLE-SHAPED

## (undated) DEVICE — BLADE MEDTRONIC ENT TRICUT ROTATABLE STRAIGHT 4MM X 11CM

## (undated) DEVICE — FRAZIER SUCTION TIP 10FR

## (undated) DEVICE — SPECIMEN CONTAINER 100ML

## (undated) DEVICE — DRAPE INSTRUMENT POUCH 6.75" X 11"

## (undated) DEVICE — SYR LUER LOK 10CC

## (undated) DEVICE — Device

## (undated) DEVICE — POSITIONER FOAM EGG CRATE ULNAR 2PCS (PINK)

## (undated) DEVICE — DRSG TELFA 3 X 8

## (undated) DEVICE — STORZ DURA MICRO KNIFE INSERT POINTED

## (undated) DEVICE — TUBING SUCTION 20FT

## (undated) DEVICE — ELCTR 4-DISC 20MM 49" (RED, BLUE, GREEN, BLACK)

## (undated) DEVICE — ELCTR BOVIE TIP BLADE INSULATED 4" EDGE

## (undated) DEVICE — MARKING PEN W RULER

## (undated) DEVICE — PACK NEURO

## (undated) DEVICE — DRAPE 1/2 SHEET 40X57"

## (undated) DEVICE — SOL IRR POUR NS 0.9% 1000ML

## (undated) DEVICE — DRSG 4 X 8"

## (undated) DEVICE — CLEANING SHEATH ENDO-SCRUB FOR STORZ 7210AA TELESCOPE 4MM 0 DEGREE

## (undated) DEVICE — DRAPE SPLIT SHEET 77" X 108"

## (undated) DEVICE — WARMING BLANKET LOWER ADULT

## (undated) DEVICE — VENODYNE/SCD SLEEVE CALF LARGE

## (undated) DEVICE — MEDICATION LABELS W MARKER

## (undated) DEVICE — GLV 7 PROTEXIS (WHITE)

## (undated) DEVICE — ELCTR ROCKER SWITCH PENCIL BLUE 10FT